# Patient Record
Sex: FEMALE | Race: OTHER | HISPANIC OR LATINO | Employment: FULL TIME | URBAN - METROPOLITAN AREA
[De-identification: names, ages, dates, MRNs, and addresses within clinical notes are randomized per-mention and may not be internally consistent; named-entity substitution may affect disease eponyms.]

---

## 2024-11-01 ENCOUNTER — APPOINTMENT (OUTPATIENT)
Dept: ADMISSIONS | Facility: MEDICAL CENTER | Age: 39
End: 2024-11-01
Attending: OBSTETRICS & GYNECOLOGY
Payer: COMMERCIAL

## 2024-11-07 ENCOUNTER — PRE-ADMISSION TESTING (OUTPATIENT)
Dept: ADMISSIONS | Facility: MEDICAL CENTER | Age: 39
End: 2024-11-07
Attending: OBSTETRICS & GYNECOLOGY
Payer: COMMERCIAL

## 2024-11-29 ENCOUNTER — HOSPITAL ENCOUNTER (INPATIENT)
Facility: MEDICAL CENTER | Age: 39
End: 2024-11-29
Attending: OBSTETRICS & GYNECOLOGY | Admitting: OBSTETRICS & GYNECOLOGY
Payer: COMMERCIAL

## 2024-11-29 ENCOUNTER — ANESTHESIA (OUTPATIENT)
Dept: OBGYN | Facility: MEDICAL CENTER | Age: 39
End: 2024-11-29
Payer: COMMERCIAL

## 2024-11-29 ENCOUNTER — ANESTHESIA EVENT (OUTPATIENT)
Dept: OBGYN | Facility: MEDICAL CENTER | Age: 39
End: 2024-11-29
Payer: COMMERCIAL

## 2024-11-29 DIAGNOSIS — G89.18 PAIN FOLLOWING SURGERY OR PROCEDURE: ICD-10-CM

## 2024-11-29 LAB
ABO GROUP BLD: NORMAL
BASOPHILS # BLD AUTO: 0.2 % (ref 0–1.8)
BASOPHILS # BLD AUTO: 0.4 % (ref 0–1.8)
BASOPHILS # BLD: 0.03 K/UL (ref 0–0.12)
BASOPHILS # BLD: 0.05 K/UL (ref 0–0.12)
BLD GP AB SCN SERPL QL: NORMAL
EOSINOPHIL # BLD AUTO: 0.35 K/UL (ref 0–0.51)
EOSINOPHIL # BLD AUTO: 0.4 K/UL (ref 0–0.51)
EOSINOPHIL NFR BLD: 2.4 % (ref 0–6.9)
EOSINOPHIL NFR BLD: 2.9 % (ref 0–6.9)
ERYTHROCYTE [DISTWIDTH] IN BLOOD BY AUTOMATED COUNT: 42.8 FL (ref 35.9–50)
ERYTHROCYTE [DISTWIDTH] IN BLOOD BY AUTOMATED COUNT: 43.8 FL (ref 35.9–50)
HBV SURFACE AG SER QL: NONREACTIVE
HCT VFR BLD AUTO: 36.6 % (ref 37–47)
HCT VFR BLD AUTO: 39.4 % (ref 37–47)
HCV AB SER QL: NONREACTIVE
HGB BLD-MCNC: 12.1 G/DL (ref 12–16)
HGB BLD-MCNC: 12.6 G/DL (ref 12–16)
HIV 1+2 AB+HIV1 P24 AG SERPL QL IA: NORMAL
HOLDING TUBE BB 8507: NORMAL
IMM GRANULOCYTES # BLD AUTO: 0.12 K/UL (ref 0–0.11)
IMM GRANULOCYTES # BLD AUTO: 0.17 K/UL (ref 0–0.11)
IMM GRANULOCYTES NFR BLD AUTO: 0.9 % (ref 0–0.9)
IMM GRANULOCYTES NFR BLD AUTO: 1.2 % (ref 0–0.9)
LYMPHOCYTES # BLD AUTO: 1.7 K/UL (ref 1–4.8)
LYMPHOCYTES # BLD AUTO: 1.82 K/UL (ref 1–4.8)
LYMPHOCYTES NFR BLD: 11.7 % (ref 22–41)
LYMPHOCYTES NFR BLD: 13.2 % (ref 22–41)
MCH RBC QN AUTO: 27.2 PG (ref 27–33)
MCH RBC QN AUTO: 27.4 PG (ref 27–33)
MCHC RBC AUTO-ENTMCNC: 32 G/DL (ref 32.2–35.5)
MCHC RBC AUTO-ENTMCNC: 33.1 G/DL (ref 32.2–35.5)
MCV RBC AUTO: 82.8 FL (ref 81.4–97.8)
MCV RBC AUTO: 84.9 FL (ref 81.4–97.8)
MONOCYTES # BLD AUTO: 0.56 K/UL (ref 0–0.85)
MONOCYTES # BLD AUTO: 0.82 K/UL (ref 0–0.85)
MONOCYTES NFR BLD AUTO: 3.9 % (ref 0–13.4)
MONOCYTES NFR BLD AUTO: 6 % (ref 0–13.4)
NEUTROPHILS # BLD AUTO: 10.57 K/UL (ref 1.82–7.42)
NEUTROPHILS # BLD AUTO: 11.73 K/UL (ref 1.82–7.42)
NEUTROPHILS NFR BLD: 76.6 % (ref 44–72)
NEUTROPHILS NFR BLD: 80.6 % (ref 44–72)
NRBC # BLD AUTO: 0 K/UL
NRBC # BLD AUTO: 0.02 K/UL
NRBC BLD-RTO: 0 /100 WBC (ref 0–0.2)
NRBC BLD-RTO: 0.1 /100 WBC (ref 0–0.2)
PLATELET # BLD AUTO: 231 K/UL (ref 164–446)
PLATELET # BLD AUTO: 233 K/UL (ref 164–446)
PMV BLD AUTO: 9.1 FL (ref 9–12.9)
PMV BLD AUTO: 9.2 FL (ref 9–12.9)
RBC # BLD AUTO: 4.42 M/UL (ref 4.2–5.4)
RBC # BLD AUTO: 4.64 M/UL (ref 4.2–5.4)
RH BLD: NORMAL
RUBV AB SER QL: 10.2 IU/ML
T PALLIDUM AB SER QL IA: NONREACTIVE
T PALLIDUM AB SER QL IA: NONREACTIVE
WBC # BLD AUTO: 13.8 K/UL (ref 4.8–10.8)
WBC # BLD AUTO: 14.5 K/UL (ref 4.8–10.8)

## 2024-11-29 PROCEDURE — 770002 HCHG ROOM/CARE - OB PRIVATE (112)

## 2024-11-29 PROCEDURE — 160041 HCHG SURGERY MINUTES - EA ADDL 1 MIN LEVEL 4: Performed by: OBSTETRICS & GYNECOLOGY

## 2024-11-29 PROCEDURE — 700105 HCHG RX REV CODE 258: Performed by: STUDENT IN AN ORGANIZED HEALTH CARE EDUCATION/TRAINING PROGRAM

## 2024-11-29 PROCEDURE — 86850 RBC ANTIBODY SCREEN: CPT

## 2024-11-29 PROCEDURE — 700101 HCHG RX REV CODE 250: Performed by: STUDENT IN AN ORGANIZED HEALTH CARE EDUCATION/TRAINING PROGRAM

## 2024-11-29 PROCEDURE — 160035 HCHG PACU - 1ST 60 MINS PHASE I: Performed by: OBSTETRICS & GYNECOLOGY

## 2024-11-29 PROCEDURE — 160009 HCHG ANES TIME/MIN: Performed by: OBSTETRICS & GYNECOLOGY

## 2024-11-29 PROCEDURE — 160002 HCHG RECOVERY MINUTES (STAT): Performed by: OBSTETRICS & GYNECOLOGY

## 2024-11-29 PROCEDURE — 700102 HCHG RX REV CODE 250 W/ 637 OVERRIDE(OP): Performed by: STUDENT IN AN ORGANIZED HEALTH CARE EDUCATION/TRAINING PROGRAM

## 2024-11-29 PROCEDURE — 86762 RUBELLA ANTIBODY: CPT

## 2024-11-29 PROCEDURE — 85025 COMPLETE CBC W/AUTO DIFF WBC: CPT | Mod: 91

## 2024-11-29 PROCEDURE — 87340 HEPATITIS B SURFACE AG IA: CPT

## 2024-11-29 PROCEDURE — A9270 NON-COVERED ITEM OR SERVICE: HCPCS | Performed by: STUDENT IN AN ORGANIZED HEALTH CARE EDUCATION/TRAINING PROGRAM

## 2024-11-29 PROCEDURE — 87389 HIV-1 AG W/HIV-1&-2 AB AG IA: CPT

## 2024-11-29 PROCEDURE — 160029 HCHG SURGERY MINUTES - 1ST 30 MINS LEVEL 4: Performed by: OBSTETRICS & GYNECOLOGY

## 2024-11-29 PROCEDURE — 700102 HCHG RX REV CODE 250 W/ 637 OVERRIDE(OP)

## 2024-11-29 PROCEDURE — A9270 NON-COVERED ITEM OR SERVICE: HCPCS

## 2024-11-29 PROCEDURE — 700102 HCHG RX REV CODE 250 W/ 637 OVERRIDE(OP): Performed by: OBSTETRICS & GYNECOLOGY

## 2024-11-29 PROCEDURE — 86901 BLOOD TYPING SEROLOGIC RH(D): CPT

## 2024-11-29 PROCEDURE — 700111 HCHG RX REV CODE 636 W/ 250 OVERRIDE (IP): Performed by: STUDENT IN AN ORGANIZED HEALTH CARE EDUCATION/TRAINING PROGRAM

## 2024-11-29 PROCEDURE — 86803 HEPATITIS C AB TEST: CPT

## 2024-11-29 PROCEDURE — 36415 COLL VENOUS BLD VENIPUNCTURE: CPT

## 2024-11-29 PROCEDURE — 700111 HCHG RX REV CODE 636 W/ 250 OVERRIDE (IP): Performed by: OBSTETRICS & GYNECOLOGY

## 2024-11-29 PROCEDURE — 86780 TREPONEMA PALLIDUM: CPT | Mod: 91

## 2024-11-29 PROCEDURE — 86900 BLOOD TYPING SEROLOGIC ABO: CPT

## 2024-11-29 PROCEDURE — 160048 HCHG OR STATISTICAL LEVEL 1-5: Performed by: OBSTETRICS & GYNECOLOGY

## 2024-11-29 PROCEDURE — A9270 NON-COVERED ITEM OR SERVICE: HCPCS | Performed by: OBSTETRICS & GYNECOLOGY

## 2024-11-29 PROCEDURE — 86592 SYPHILIS TEST NON-TREP QUAL: CPT

## 2024-11-29 PROCEDURE — C1755 CATHETER, INTRASPINAL: HCPCS | Performed by: OBSTETRICS & GYNECOLOGY

## 2024-11-29 PROCEDURE — 700105 HCHG RX REV CODE 258: Performed by: OBSTETRICS & GYNECOLOGY

## 2024-11-29 RX ORDER — CITRIC ACID/SODIUM CITRATE 334-500MG
30 SOLUTION, ORAL ORAL ONCE
Status: COMPLETED | OUTPATIENT
Start: 2024-11-29 | End: 2024-11-29

## 2024-11-29 RX ORDER — KETOROLAC TROMETHAMINE 15 MG/ML
INJECTION, SOLUTION INTRAMUSCULAR; INTRAVENOUS PRN
Status: DISCONTINUED | OUTPATIENT
Start: 2024-11-29 | End: 2024-11-29 | Stop reason: SURG

## 2024-11-29 RX ORDER — SODIUM CHLORIDE 9 MG/ML
INJECTION, SOLUTION INTRAVENOUS ONCE
Status: DISCONTINUED | OUTPATIENT
Start: 2024-11-29 | End: 2024-12-01 | Stop reason: HOSPADM

## 2024-11-29 RX ORDER — SODIUM CHLORIDE 9 MG/ML
INJECTION, SOLUTION INTRAVENOUS CONTINUOUS
Status: DISCONTINUED | OUTPATIENT
Start: 2024-11-29 | End: 2024-12-01 | Stop reason: HOSPADM

## 2024-11-29 RX ORDER — MISOPROSTOL 200 UG/1
1000 TABLET ORAL ONCE
Status: COMPLETED | OUTPATIENT
Start: 2024-11-29 | End: 2024-11-29

## 2024-11-29 RX ORDER — SODIUM CHLORIDE, SODIUM LACTATE, POTASSIUM CHLORIDE, AND CALCIUM CHLORIDE .6; .31; .03; .02 G/100ML; G/100ML; G/100ML; G/100ML
1000 INJECTION, SOLUTION INTRAVENOUS ONCE
Status: COMPLETED | OUTPATIENT
Start: 2024-11-29 | End: 2024-11-30

## 2024-11-29 RX ORDER — ONDANSETRON 2 MG/ML
4 INJECTION INTRAMUSCULAR; INTRAVENOUS EVERY 6 HOURS PRN
Status: DISCONTINUED | OUTPATIENT
Start: 2024-11-30 | End: 2024-12-01 | Stop reason: HOSPADM

## 2024-11-29 RX ORDER — SIMETHICONE 125 MG
125 TABLET,CHEWABLE ORAL 4 TIMES DAILY PRN
Status: DISCONTINUED | OUTPATIENT
Start: 2024-11-29 | End: 2024-12-01 | Stop reason: HOSPADM

## 2024-11-29 RX ORDER — METOCLOPRAMIDE HYDROCHLORIDE 5 MG/ML
10 INJECTION INTRAMUSCULAR; INTRAVENOUS ONCE
Status: COMPLETED | OUTPATIENT
Start: 2024-11-29 | End: 2024-11-29

## 2024-11-29 RX ORDER — ACETAMINOPHEN 500 MG
1000 TABLET ORAL EVERY 6 HOURS
Status: COMPLETED | OUTPATIENT
Start: 2024-11-29 | End: 2024-11-30

## 2024-11-29 RX ORDER — SODIUM CHLORIDE, SODIUM LACTATE, POTASSIUM CHLORIDE, CALCIUM CHLORIDE 600; 310; 30; 20 MG/100ML; MG/100ML; MG/100ML; MG/100ML
INJECTION, SOLUTION INTRAVENOUS
Status: DISCONTINUED | OUTPATIENT
Start: 2024-11-29 | End: 2024-11-29 | Stop reason: SURG

## 2024-11-29 RX ORDER — DIPHENHYDRAMINE HYDROCHLORIDE 50 MG/ML
INJECTION INTRAMUSCULAR; INTRAVENOUS PRN
Status: DISCONTINUED | OUTPATIENT
Start: 2024-11-29 | End: 2024-11-29 | Stop reason: SURG

## 2024-11-29 RX ORDER — CEFAZOLIN SODIUM 1 G/3ML
2 INJECTION, POWDER, FOR SOLUTION INTRAMUSCULAR; INTRAVENOUS ONCE
Status: DISCONTINUED | OUTPATIENT
Start: 2024-11-29 | End: 2024-11-29 | Stop reason: HOSPADM

## 2024-11-29 RX ORDER — OXYCODONE HCL 5 MG/5 ML
10 SOLUTION, ORAL ORAL
Status: DISCONTINUED | OUTPATIENT
Start: 2024-11-29 | End: 2024-11-29 | Stop reason: HOSPADM

## 2024-11-29 RX ORDER — CEFAZOLIN SODIUM 1 G/3ML
INJECTION, POWDER, FOR SOLUTION INTRAMUSCULAR; INTRAVENOUS PRN
Status: DISCONTINUED | OUTPATIENT
Start: 2024-11-29 | End: 2024-11-29 | Stop reason: SURG

## 2024-11-29 RX ORDER — ONDANSETRON 4 MG/1
4 TABLET, ORALLY DISINTEGRATING ORAL EVERY 6 HOURS PRN
Status: DISCONTINUED | OUTPATIENT
Start: 2024-11-30 | End: 2024-12-01 | Stop reason: HOSPADM

## 2024-11-29 RX ORDER — SODIUM CHLORIDE, SODIUM LACTATE, POTASSIUM CHLORIDE, CALCIUM CHLORIDE 600; 310; 30; 20 MG/100ML; MG/100ML; MG/100ML; MG/100ML
INJECTION, SOLUTION INTRAVENOUS CONTINUOUS
Status: DISCONTINUED | OUTPATIENT
Start: 2024-11-29 | End: 2024-12-01 | Stop reason: HOSPADM

## 2024-11-29 RX ORDER — SODIUM CHLORIDE 9 MG/ML
1000 INJECTION, SOLUTION INTRAVENOUS ONCE
Status: DISCONTINUED | OUTPATIENT
Start: 2024-11-29 | End: 2024-11-29 | Stop reason: HOSPADM

## 2024-11-29 RX ORDER — IBUPROFEN 800 MG/1
800 TABLET, FILM COATED ORAL EVERY 8 HOURS
Status: DISCONTINUED | OUTPATIENT
Start: 2024-11-30 | End: 2024-12-01 | Stop reason: HOSPADM

## 2024-11-29 RX ORDER — HYDROMORPHONE HYDROCHLORIDE 1 MG/ML
0.2 INJECTION, SOLUTION INTRAMUSCULAR; INTRAVENOUS; SUBCUTANEOUS
Status: DISCONTINUED | OUTPATIENT
Start: 2024-11-29 | End: 2024-11-29 | Stop reason: HOSPADM

## 2024-11-29 RX ORDER — MISOPROSTOL 200 UG/1
TABLET ORAL
Status: COMPLETED
Start: 2024-11-29 | End: 2024-11-29

## 2024-11-29 RX ORDER — VITAMIN A ACETATE, BETA CAROTENE, ASCORBIC ACID, CHOLECALCIFEROL, .ALPHA.-TOCOPHEROL ACETATE, DL-, THIAMINE MONONITRATE, RIBOFLAVIN, NIACINAMIDE, PYRIDOXINE HYDROCHLORIDE, FOLIC ACID, CYANOCOBALAMIN, CALCIUM CARBONATE, FERROUS FUMARATE, ZINC OXIDE, CUPRIC OXIDE 3080; 12; 120; 400; 1; 1.84; 3; 20; 22; 920; 25; 200; 27; 10; 2 [IU]/1; UG/1; MG/1; [IU]/1; MG/1; MG/1; MG/1; MG/1; MG/1; [IU]/1; MG/1; MG/1; MG/1; MG/1; MG/1
1 TABLET, FILM COATED ORAL
Status: DISCONTINUED | OUTPATIENT
Start: 2024-11-29 | End: 2024-12-01 | Stop reason: HOSPADM

## 2024-11-29 RX ORDER — LABETALOL HYDROCHLORIDE 5 MG/ML
5 INJECTION, SOLUTION INTRAVENOUS
Status: DISCONTINUED | OUTPATIENT
Start: 2024-11-29 | End: 2024-11-29 | Stop reason: HOSPADM

## 2024-11-29 RX ORDER — OXYCODONE HYDROCHLORIDE 10 MG/1
10 TABLET ORAL EVERY 4 HOURS PRN
Status: DISPENSED | OUTPATIENT
Start: 2024-11-29 | End: 2024-11-30

## 2024-11-29 RX ORDER — DEXAMETHASONE SODIUM PHOSPHATE 4 MG/ML
INJECTION, SOLUTION INTRA-ARTICULAR; INTRALESIONAL; INTRAMUSCULAR; INTRAVENOUS; SOFT TISSUE PRN
Status: DISCONTINUED | OUTPATIENT
Start: 2024-11-29 | End: 2024-11-29 | Stop reason: SURG

## 2024-11-29 RX ORDER — METOPROLOL TARTRATE 1 MG/ML
1 INJECTION, SOLUTION INTRAVENOUS
Status: DISCONTINUED | OUTPATIENT
Start: 2024-11-29 | End: 2024-11-29 | Stop reason: HOSPADM

## 2024-11-29 RX ORDER — DIPHENHYDRAMINE HYDROCHLORIDE 50 MG/ML
12.5 INJECTION INTRAMUSCULAR; INTRAVENOUS EVERY 6 HOURS PRN
Status: ACTIVE | OUTPATIENT
Start: 2024-11-29 | End: 2024-11-30

## 2024-11-29 RX ORDER — HALOPERIDOL 5 MG/ML
1 INJECTION INTRAMUSCULAR
Status: DISCONTINUED | OUTPATIENT
Start: 2024-11-29 | End: 2024-11-29 | Stop reason: HOSPADM

## 2024-11-29 RX ORDER — ONDANSETRON 2 MG/ML
INJECTION INTRAMUSCULAR; INTRAVENOUS PRN
Status: DISCONTINUED | OUTPATIENT
Start: 2024-11-29 | End: 2024-11-29 | Stop reason: SURG

## 2024-11-29 RX ORDER — HYDROMORPHONE HYDROCHLORIDE 1 MG/ML
0.4 INJECTION, SOLUTION INTRAMUSCULAR; INTRAVENOUS; SUBCUTANEOUS
Status: ACTIVE | OUTPATIENT
Start: 2024-11-29 | End: 2024-11-30

## 2024-11-29 RX ORDER — DIPHENHYDRAMINE HYDROCHLORIDE 50 MG/ML
25 INJECTION INTRAMUSCULAR; INTRAVENOUS EVERY 6 HOURS PRN
Status: ACTIVE | OUTPATIENT
Start: 2024-11-29 | End: 2024-11-30

## 2024-11-29 RX ORDER — ALBUTEROL SULFATE 5 MG/ML
2.5 SOLUTION RESPIRATORY (INHALATION)
Status: DISCONTINUED | OUTPATIENT
Start: 2024-11-29 | End: 2024-11-29 | Stop reason: HOSPADM

## 2024-11-29 RX ORDER — OXYTOCIN 10 [USP'U]/ML
INJECTION, SOLUTION INTRAMUSCULAR; INTRAVENOUS PRN
Status: DISCONTINUED | OUTPATIENT
Start: 2024-11-29 | End: 2024-11-29 | Stop reason: SURG

## 2024-11-29 RX ORDER — BUPIVACAINE HYDROCHLORIDE 7.5 MG/ML
INJECTION, SOLUTION INTRASPINAL
Status: COMPLETED | OUTPATIENT
Start: 2024-11-29 | End: 2024-11-29

## 2024-11-29 RX ORDER — HYDROMORPHONE HYDROCHLORIDE 1 MG/ML
0.1 INJECTION, SOLUTION INTRAMUSCULAR; INTRAVENOUS; SUBCUTANEOUS
Status: DISCONTINUED | OUTPATIENT
Start: 2024-11-29 | End: 2024-11-29 | Stop reason: HOSPADM

## 2024-11-29 RX ORDER — ACETAMINOPHEN 500 MG
1000 TABLET ORAL EVERY 6 HOURS
Status: DISCONTINUED | OUTPATIENT
Start: 2024-11-30 | End: 2024-12-01 | Stop reason: HOSPADM

## 2024-11-29 RX ORDER — OXYCODONE HCL 5 MG/5 ML
5 SOLUTION, ORAL ORAL
Status: DISCONTINUED | OUTPATIENT
Start: 2024-11-29 | End: 2024-11-29 | Stop reason: HOSPADM

## 2024-11-29 RX ORDER — HYDROMORPHONE HYDROCHLORIDE 1 MG/ML
0.2 INJECTION, SOLUTION INTRAMUSCULAR; INTRAVENOUS; SUBCUTANEOUS
Status: ACTIVE | OUTPATIENT
Start: 2024-11-29 | End: 2024-11-30

## 2024-11-29 RX ORDER — ACETAMINOPHEN 500 MG
1000 TABLET ORAL EVERY 6 HOURS PRN
Status: DISCONTINUED | OUTPATIENT
Start: 2024-12-03 | End: 2024-12-01 | Stop reason: HOSPADM

## 2024-11-29 RX ORDER — OXYCODONE HYDROCHLORIDE 5 MG/1
5 TABLET ORAL EVERY 4 HOURS PRN
Status: DISCONTINUED | OUTPATIENT
Start: 2024-11-30 | End: 2024-12-01 | Stop reason: HOSPADM

## 2024-11-29 RX ORDER — DOCUSATE SODIUM 100 MG/1
100 CAPSULE, LIQUID FILLED ORAL 2 TIMES DAILY PRN
Status: DISCONTINUED | OUTPATIENT
Start: 2024-11-29 | End: 2024-12-01 | Stop reason: HOSPADM

## 2024-11-29 RX ORDER — EPHEDRINE SULFATE 50 MG/ML
10 INJECTION, SOLUTION INTRAVENOUS
Status: ACTIVE | OUTPATIENT
Start: 2024-11-29 | End: 2024-11-30

## 2024-11-29 RX ORDER — EPHEDRINE SULFATE 50 MG/ML
5 INJECTION, SOLUTION INTRAVENOUS
Status: DISCONTINUED | OUTPATIENT
Start: 2024-11-29 | End: 2024-11-29 | Stop reason: HOSPADM

## 2024-11-29 RX ORDER — OXYCODONE HYDROCHLORIDE 5 MG/1
5 TABLET ORAL EVERY 4 HOURS PRN
Status: DISPENSED | OUTPATIENT
Start: 2024-11-29 | End: 2024-11-30

## 2024-11-29 RX ORDER — CALCIUM CARBONATE 500 MG/1
1000 TABLET, CHEWABLE ORAL EVERY 6 HOURS PRN
Status: DISCONTINUED | OUTPATIENT
Start: 2024-11-29 | End: 2024-12-01 | Stop reason: HOSPADM

## 2024-11-29 RX ORDER — ONDANSETRON 2 MG/ML
4 INJECTION INTRAMUSCULAR; INTRAVENOUS
Status: DISCONTINUED | OUTPATIENT
Start: 2024-11-29 | End: 2024-11-29 | Stop reason: HOSPADM

## 2024-11-29 RX ORDER — DIPHENHYDRAMINE HYDROCHLORIDE 50 MG/ML
25 INJECTION INTRAMUSCULAR; INTRAVENOUS EVERY 6 HOURS PRN
Status: DISCONTINUED | OUTPATIENT
Start: 2024-11-30 | End: 2024-12-01 | Stop reason: HOSPADM

## 2024-11-29 RX ORDER — DIPHENHYDRAMINE HYDROCHLORIDE 50 MG/ML
12.5 INJECTION INTRAMUSCULAR; INTRAVENOUS
Status: DISCONTINUED | OUTPATIENT
Start: 2024-11-29 | End: 2024-11-29 | Stop reason: HOSPADM

## 2024-11-29 RX ORDER — IBUPROFEN 800 MG/1
800 TABLET, FILM COATED ORAL EVERY 8 HOURS PRN
Status: DISCONTINUED | OUTPATIENT
Start: 2024-12-03 | End: 2024-12-01 | Stop reason: HOSPADM

## 2024-11-29 RX ORDER — HYDRALAZINE HYDROCHLORIDE 20 MG/ML
5 INJECTION INTRAMUSCULAR; INTRAVENOUS
Status: DISCONTINUED | OUTPATIENT
Start: 2024-11-29 | End: 2024-11-29 | Stop reason: HOSPADM

## 2024-11-29 RX ORDER — OXYCODONE HYDROCHLORIDE 10 MG/1
10 TABLET ORAL EVERY 4 HOURS PRN
Status: DISCONTINUED | OUTPATIENT
Start: 2024-11-30 | End: 2024-12-01 | Stop reason: HOSPADM

## 2024-11-29 RX ORDER — MORPHINE SULFATE 0.5 MG/ML
INJECTION, SOLUTION EPIDURAL; INTRATHECAL; INTRAVENOUS PRN
Status: DISCONTINUED | OUTPATIENT
Start: 2024-11-29 | End: 2024-11-29 | Stop reason: SURG

## 2024-11-29 RX ORDER — OXYTOCIN 10 [USP'U]/ML
10 INJECTION, SOLUTION INTRAMUSCULAR; INTRAVENOUS
Status: DISCONTINUED | OUTPATIENT
Start: 2024-11-29 | End: 2024-12-01 | Stop reason: HOSPADM

## 2024-11-29 RX ORDER — TRANEXAMIC ACID 100 MG/ML
INJECTION, SOLUTION INTRAVENOUS PRN
Status: DISCONTINUED | OUTPATIENT
Start: 2024-11-29 | End: 2024-11-29 | Stop reason: SURG

## 2024-11-29 RX ORDER — MEPERIDINE HYDROCHLORIDE 25 MG/ML
6.25 INJECTION INTRAMUSCULAR; INTRAVENOUS; SUBCUTANEOUS
Status: DISCONTINUED | OUTPATIENT
Start: 2024-11-29 | End: 2024-11-29 | Stop reason: HOSPADM

## 2024-11-29 RX ORDER — ONDANSETRON 2 MG/ML
4 INJECTION INTRAMUSCULAR; INTRAVENOUS EVERY 6 HOURS PRN
Status: ACTIVE | OUTPATIENT
Start: 2024-11-29 | End: 2024-11-30

## 2024-11-29 RX ORDER — HYDROMORPHONE HYDROCHLORIDE 1 MG/ML
0.4 INJECTION, SOLUTION INTRAMUSCULAR; INTRAVENOUS; SUBCUTANEOUS
Status: DISCONTINUED | OUTPATIENT
Start: 2024-11-29 | End: 2024-11-29 | Stop reason: HOSPADM

## 2024-11-29 RX ORDER — DIPHENHYDRAMINE HCL 25 MG
25 TABLET ORAL EVERY 6 HOURS PRN
Status: DISCONTINUED | OUTPATIENT
Start: 2024-11-30 | End: 2024-12-01 | Stop reason: HOSPADM

## 2024-11-29 RX ORDER — KETOROLAC TROMETHAMINE 15 MG/ML
15 INJECTION, SOLUTION INTRAMUSCULAR; INTRAVENOUS EVERY 6 HOURS
Status: DISPENSED | OUTPATIENT
Start: 2024-11-29 | End: 2024-11-30

## 2024-11-29 RX ADMIN — CEFAZOLIN 3 G: 1 INJECTION, POWDER, FOR SOLUTION INTRAMUSCULAR; INTRAVENOUS at 08:42

## 2024-11-29 RX ADMIN — ACETAMINOPHEN 1000 MG: 500 TABLET ORAL at 11:51

## 2024-11-29 RX ADMIN — ACETAMINOPHEN 1000 MG: 500 TABLET ORAL at 23:43

## 2024-11-29 RX ADMIN — OXYTOCIN 125 ML/HR: 10 INJECTION, SOLUTION INTRAMUSCULAR; INTRAVENOUS at 10:42

## 2024-11-29 RX ADMIN — FENTANYL CITRATE 15 MCG: 50 INJECTION, SOLUTION INTRAMUSCULAR; INTRAVENOUS at 08:37

## 2024-11-29 RX ADMIN — SODIUM CHLORIDE, SODIUM LACTATE, POTASSIUM CHLORIDE, AND CALCIUM CHLORIDE 1000 ML: .6; .31; .03; .02 INJECTION, SOLUTION INTRAVENOUS at 08:15

## 2024-11-29 RX ADMIN — TRANEXAMIC ACID 1000 MG: 100 INJECTION, SOLUTION INTRAVENOUS at 09:01

## 2024-11-29 RX ADMIN — KETOROLAC TROMETHAMINE 15 MG: 15 INJECTION, SOLUTION INTRAMUSCULAR; INTRAVENOUS at 09:23

## 2024-11-29 RX ADMIN — MISOPROSTOL 1000 MCG: 200 TABLET ORAL at 10:00

## 2024-11-29 RX ADMIN — METOCLOPRAMIDE 10 MG: 5 INJECTION, SOLUTION INTRAMUSCULAR; INTRAVENOUS at 08:11

## 2024-11-29 RX ADMIN — SODIUM CHLORIDE, POTASSIUM CHLORIDE, SODIUM LACTATE AND CALCIUM CHLORIDE: 600; 310; 30; 20 INJECTION, SOLUTION INTRAVENOUS at 08:56

## 2024-11-29 RX ADMIN — SODIUM CHLORIDE 0.5 MCG/KG/MIN: 9 INJECTION, SOLUTION INTRAVENOUS at 08:39

## 2024-11-29 RX ADMIN — SODIUM CITRATE AND CITRIC ACID MONOHYDRATE 30 ML: 334; 500 SOLUTION ORAL at 08:24

## 2024-11-29 RX ADMIN — DEXAMETHASONE SODIUM PHOSPHATE 8 MG: 4 INJECTION INTRA-ARTICULAR; INTRALESIONAL; INTRAMUSCULAR; INTRAVENOUS; SOFT TISSUE at 08:42

## 2024-11-29 RX ADMIN — KETOROLAC TROMETHAMINE 15 MG: 15 INJECTION, SOLUTION INTRAMUSCULAR; INTRAVENOUS at 16:24

## 2024-11-29 RX ADMIN — DIPHENHYDRAMINE HYDROCHLORIDE 25 MG: 50 INJECTION, SOLUTION INTRAMUSCULAR; INTRAVENOUS at 09:09

## 2024-11-29 RX ADMIN — SODIUM CHLORIDE, POTASSIUM CHLORIDE, SODIUM LACTATE AND CALCIUM CHLORIDE: 600; 310; 30; 20 INJECTION, SOLUTION INTRAVENOUS at 07:27

## 2024-11-29 RX ADMIN — PRENATAL WITH FERROUS FUM AND FOLIC ACID 1 TABLET: 3080; 920; 120; 400; 22; 1.84; 3; 20; 10; 1; 12; 200; 27; 25; 2 TABLET ORAL at 11:51

## 2024-11-29 RX ADMIN — ONDANSETRON 4 MG: 2 INJECTION INTRAMUSCULAR; INTRAVENOUS at 08:33

## 2024-11-29 RX ADMIN — OXYCODONE HYDROCHLORIDE 10 MG: 10 TABLET ORAL at 17:51

## 2024-11-29 RX ADMIN — FAMOTIDINE 20 MG: 10 INJECTION, SOLUTION INTRAVENOUS at 08:11

## 2024-11-29 RX ADMIN — BUPIVACAINE HYDROCHLORIDE IN DEXTROSE 1.4 ML: 7.5 INJECTION, SOLUTION SUBARACHNOID at 08:37

## 2024-11-29 RX ADMIN — MORPHINE SULFATE 100 MCG: 0.5 INJECTION, SOLUTION EPIDURAL; INTRATHECAL; INTRAVENOUS at 08:37

## 2024-11-29 RX ADMIN — OXYTOCIN 20 UNITS: 10 INJECTION, SOLUTION INTRAMUSCULAR; INTRAVENOUS at 08:58

## 2024-11-29 RX ADMIN — OXYCODONE 5 MG: 5 TABLET ORAL at 13:51

## 2024-11-29 RX ADMIN — SODIUM CHLORIDE, SODIUM LACTATE, POTASSIUM CHLORIDE, AND CALCIUM CHLORIDE: .6; .31; .03; .02 INJECTION, SOLUTION INTRAVENOUS at 18:22

## 2024-11-29 RX ADMIN — ACETAMINOPHEN 1000 MG: 500 TABLET ORAL at 17:34

## 2024-11-29 SDOH — ECONOMIC STABILITY: TRANSPORTATION INSECURITY
IN THE PAST 12 MONTHS, HAS THE LACK OF TRANSPORTATION KEPT YOU FROM MEDICAL APPOINTMENTS OR FROM GETTING MEDICATIONS?: NO

## 2024-11-29 SDOH — ECONOMIC STABILITY: TRANSPORTATION INSECURITY
IN THE PAST 12 MONTHS, HAS LACK OF RELIABLE TRANSPORTATION KEPT YOU FROM MEDICAL APPOINTMENTS, MEETINGS, WORK OR FROM GETTING THINGS NEEDED FOR DAILY LIVING?: NO

## 2024-11-29 ASSESSMENT — SOCIAL DETERMINANTS OF HEALTH (SDOH)
WITHIN THE PAST 12 MONTHS, YOU WORRIED THAT YOUR FOOD WOULD RUN OUT BEFORE YOU GOT THE MONEY TO BUY MORE: NEVER TRUE
WITHIN THE PAST 12 MONTHS, THE FOOD YOU BOUGHT JUST DIDN'T LAST AND YOU DIDN'T HAVE MONEY TO GET MORE: NEVER TRUE
IN THE PAST 12 MONTHS, HAS THE ELECTRIC, GAS, OIL, OR WATER COMPANY THREATENED TO SHUT OFF SERVICE IN YOUR HOME?: NO
WITHIN THE LAST YEAR, HAVE TO BEEN RAPED OR FORCED TO HAVE ANY KIND OF SEXUAL ACTIVITY BY YOUR PARTNER OR EX-PARTNER?: NO
WITHIN THE LAST YEAR, HAVE YOU BEEN HUMILIATED OR EMOTIONALLY ABUSED IN OTHER WAYS BY YOUR PARTNER OR EX-PARTNER?: NO
WITHIN THE LAST YEAR, HAVE YOU BEEN AFRAID OF YOUR PARTNER OR EX-PARTNER?: NO
WITHIN THE LAST YEAR, HAVE YOU BEEN KICKED, HIT, SLAPPED, OR OTHERWISE PHYSICALLY HURT BY YOUR PARTNER OR EX-PARTNER?: NO

## 2024-11-29 ASSESSMENT — PAIN DESCRIPTION - PAIN TYPE
TYPE: SURGICAL PAIN
TYPE: ACUTE PAIN
TYPE: SURGICAL PAIN

## 2024-11-29 ASSESSMENT — LIFESTYLE VARIABLES
TOTAL SCORE: 0
EVER HAD A DRINK FIRST THING IN THE MORNING TO STEADY YOUR NERVES TO GET RID OF A HANGOVER: NO
EVER FELT BAD OR GUILTY ABOUT YOUR DRINKING: NO
AVERAGE NUMBER OF DAYS PER WEEK YOU HAVE A DRINK CONTAINING ALCOHOL: 0
HAVE PEOPLE ANNOYED YOU BY CRITICIZING YOUR DRINKING: NO
HAVE YOU EVER FELT YOU SHOULD CUT DOWN ON YOUR DRINKING: NO
TOTAL SCORE: 0
ALCOHOL_USE: NO
CONSUMPTION TOTAL: NEGATIVE
ON A TYPICAL DAY WHEN YOU DRINK ALCOHOL HOW MANY DRINKS DO YOU HAVE: 0
TOTAL SCORE: 0
HOW MANY TIMES IN THE PAST YEAR HAVE YOU HAD 5 OR MORE DRINKS IN A DAY: 0
DOES PATIENT WANT TO STOP DRINKING: NO

## 2024-11-29 ASSESSMENT — PATIENT HEALTH QUESTIONNAIRE - PHQ9
2. FEELING DOWN, DEPRESSED, IRRITABLE, OR HOPELESS: NOT AT ALL
1. LITTLE INTEREST OR PLEASURE IN DOING THINGS: NOT AT ALL
SUM OF ALL RESPONSES TO PHQ9 QUESTIONS 1 AND 2: 0

## 2024-11-29 ASSESSMENT — PAIN SCALES - GENERAL: PAINLEVEL: 0 - NO PAIN

## 2024-11-29 NOTE — CARE PLAN
The patient is Stable - Low risk of patient condition declining or worsening    Shift Goals  Clinical Goals: VSS    Progress made toward(s) clinical / shift goals:    Problem: Knowledge Deficit - L&D  Goal: Patient and family/caregivers will demonstrate understanding of plan of care, disease process/condition, diagnostic tests and medications  Description: Target End Date:  1-3 days or as soon as patient condition allows    1.  Oriented to unit, equipment, visitation policy and means for communicating concern  2.  Complete/review Learning Assessment  3.  Assess patient's preparation, knowledge level and expectations  4.  Provide accurate information in basic terms, clarify misconceptions  5.  Explain disease process/condition, treatment plan, diagnostic tests and medications  6.  Encourage patient and support person to ask questions and verbalize their understanding  7.  Instruct patient/support person in basic interpretation of fetal monitor  8.  Obtain informed consent when appropriate  9.  Demonstrate breathing/relaxation techniques  Outcome: Progressing  Note: Patient encouraged to ask RN questions about POC that the patient may require further understanding     Problem: Pain - Standard  Goal: Alleviation of pain or a reduction in pain to the patient’s comfort goal  Description: Target End Date:  Prior to discharge or change in level of care    Document on Vitals flowsheet    1.  Document pain using the appropriate pain scale per order or unit policy  2.  Educate and implement non-pharmacologic comfort measures (i.e. relaxation, distraction, massage, cold/heat therapy, etc.)  3.  Pain management medications as ordered  4.  Reassess pain after pain med administration per policy  5.  If opiods administered assess patient's response to pain medication is appropriate per POSS sedation scale  6.  Follow pain management plan developed in collaboration with patient and interdisciplinary team (including palliative care  or pain specialists if applicable)  Outcome: Progressing  Note: Patient educated on post surgical pain management and encouraged to express pain intervention needs to RN.        Patient is not progressing towards the following goals:

## 2024-11-29 NOTE — H&P
DATE OF ADMISSION:  2024     DATE OF PROCEDURE:  2024     CSN number:  [id number]     PREOPERATIVE DIAGNOSES:   1.  Intrauterine pregnancy at 37+1 weeks' gestation.  2.  History of previous , desires elective repeat.  3.  Advanced maternal age.  4.  Umbilical vein varix followed by [facility] with the   recommendation of delivery at 37 weeks' gestation.     HISTORY OF PRESENT ILLNESS:  The patient is a 39-year-old  2, para   1-0-0-1, at 37+1 weeks' gestation based upon a 9+4 week ultrasound performed   on 2024.  The patient has been followed by [facility] for   advanced maternal age and history of previous  and was noted to have   an umbilical vein varix.  This has been evaluated through multiple   ultrasounds.  Hepatic portion of the umbilical vein was significantly dilated.    They recommended delivery between 37 and 37-1/2 weeks' gestation.  She has   been followed by NSTs twice weekly.  Risks, benefits and alternatives of a   repeat low transverse  section via Pfannenstiel skin incision was   discussed with the patient and she agreed to proceed.  She declines tubal   sterilization.     Prenatal care with Dr. Yanelis Buck, first visit on 2024 at 9+4 weeks'   gestation, total visits 13, total weight gain 40 pounds.  Third-trimester   blood pressures 119-142/65-85.     PRENATAL LABS:  GBS negative on 2024.  One-hour .  MaterniT 21   testing was negative for trisomies 21, 18, 13, and female fetus noted.  MSAFP   for open spina bifida risk was screen negative.  GC/chlamydia;   negative/negative.     I am not seeing a copy of the remainder of her prenatal labs and she will need   those drawn upon admission.     OBSTETRICAL HISTORY:  Primary  at 40 weeks' gestation, 8 pound male   infant.      OBSTETRIC ULTRASOUND:    1.  On 2024 at 9+4 weeks' gestation, GURINDER 2024.  1.  On 2024 at 13+0 weeks'  gestation, GURINDER 2024.  2.  On 2024 at 13+6 weeks' gestation, GURINDER 2024.  3.  On 2024 at 19+6 weeks' gestation, GURINDER 2024.  4.  On 2024 at 23+5 weeks' gestation, GURINDER 2024.  5.  On 2024 at 27+5 weeks' gestation performed at [facility], estimated fetal weight 1102 grams, 2 pounds 7 ounces.  28th percentile   for growth.  Female fetus.  CARMINA 18.1.  Umbilical vein varices visualized   immediately within the cord insertion site.  Hepatic portion of the umbilical   vein is significantly dilated.  Posterior placenta, no placenta previa.     ISSUES THIS PREGNANCY:  AMA status, morbid obesity, umbilical vein varices,   history of previous  x1.     PAST SURGICAL HISTORY:   x1.  Also, she has undergone a hysteroscopic   endometrial polypectomy.     PAST MEDICAL HISTORY:  Hashimoto's thyroiditis.  Normal thyroid studies.  She   also has a history of asthma and a previous history of COVID-19.     ALLERGIES:  SHELLFISH LEADING TO SWELLING, IODINE AND ACYCLOVIR.     SOCIAL HISTORY:  She denies alcohol, tobacco or drugs of abuse.  She is   .     MEDICATIONS:  Baby aspirin, prenatal vitamins and vitamin B12.     PHYSICAL EXAMINATION:    VITAL SIGNS:  Stable.  She is afebrile.  GENERAL:  Alert, awake and oriented x3, in no acute distress.  LUNGS:  Clear to auscultation bilaterally.  HEART:  Regular rate and rhythm.  No murmurs, rubs or gallops.  S1, S2 intact.  PELVIC:  Sterile vaginal exam is deferred.  EXTREMITIES:  No clubbing, cyanosis or edema.     BMI is 45.      NST is reactive, without decelerations.     LABORATORY DATA:  Preoperative laboratory studies are pending.     Estimated fetal weight 2900 grams.     ASSESSMENT AND PLAN:  A 39-year-old  2, para 1-0-0-1, at 37+1 weeks'   gestation, who has a history of a previous  x1, who is morbidly obese   and who is advanced maternal age.  In addition, there is umbilical vein   varices  for which she has been [facility] by [facility] and they   recommend delivery at 37 weeks' gestation.  Risks, benefits and alternatives   of a repeat  without bilateral salpingectomy discussed with the   patient and she agrees to proceed.        ______________________________  MD JAZMINE Cunha/ANITA    DD:  2024 16:18  DT:  2024 17:11    Job#:  [id number]

## 2024-11-29 NOTE — PROGRESS NOTES
1055: Patient was transfered from L&D via [facility]. Recieved report from Kisha GUARDADO. Fundus firm. Bands and cuddles verified. Patient oriented to room including sleep sack education, I/O sheet, feeding frequency, call light, when to push emergency button, keeping infant swaddled, and a hat on infant at all times. Welcome bag given. Plan of care discussed. Patient encouraged to call with any needs.     1730: Went to get up this pt up with ASHUTOSH Ambrose. Noticed a few long stringy clots. Fundus firm. Weighed pad see charting. Called Christin elliott RN. Fundus firm. Pt stated not feeling dizzy, weak, or light headed when asked.     1745: Pt up at side of bed for mohit care no bleeding. Fundus firm.    1802: MD Buck notified. No new orders.

## 2024-11-29 NOTE — ANESTHESIA TIME REPORT
Anesthesia Start and Stop Event Times       Date Time Event    11/29/2024 0826 Ready for Procedure     0829 Anesthesia Start     0935 Anesthesia Stop          Responsible Staff  11/29/24      Name Role Begin End    Vito Willams D.O. Anesth 0829 0935          Overtime Reason:  no overtime (within assigned shift)    Comments:

## 2024-11-29 NOTE — ANESTHESIA POSTPROCEDURE EVALUATION
Patient: Naomi Rivera    Procedure Summary       Date: 24 Room / Location: [facility] OR 01 / SURGERY LABOR AND DELIVERY    Anesthesia Start: 829 Anesthesia Stop: 935    Procedure: REPEAT  SECTION AND ANY OTHER MEDICALLY NECESSARY PROCEDURES Diagnosis: (PREGNANCY 3RD TRIMESTER, UMBILICAL VEIN VARIX - 37;1D)    Surgeons: Ynaelis Mei M.D. Responsible Provider: Vito Willams D.O.    Anesthesia Type: spinal ASA Status: 3            Final Anesthesia Type: spinal  Last vitals  BP   Blood Pressure: 124/72    Temp   36.4 °C (97.6 °F)    Pulse   75   Resp   16    SpO2   97 %      Anesthesia Post Evaluation    Patient location during evaluation: PACU  Patient participation: complete - patient participated  Level of consciousness: awake and alert    Airway patency: patent  Anesthetic complications: no  Cardiovascular status: hemodynamically stable  Respiratory status: acceptable  Hydration status: euvolemic    PONV: none    patient able to participate, but full recovery from regional anesthesia has not occurred and is not expected within the stipulated timeframe for the completion of the evaluation      No notable events documented.     Nurse Pain Score: 0 (NPRS)

## 2024-11-29 NOTE — ANESTHESIA PREPROCEDURE EVALUATION
Case: [id number] Date/Time: 24 0800    Procedure: REPEAT  SECTION AND ANY OTHER MEDICALLY NECESSARY PROCEDURES    Pre-op diagnosis: PREGNANCY 3RD TRIMESTER, UMBILICAL VEIN VARIX - 37;1D    Location: [facility] OR  / SURGERY LABOR AND DELIVERY    Surgeons: Yanelis Mei M.D.          Anes H&P:  PAST MEDICAL HISTORY:   39 y.o. female who presents for Procedure(s):  REPEAT  SECTION AND ANY OTHER MEDICALLY NECESSARY PROCEDURES.  She has current and past medical problems significant for:    Past Medical History:   Diagnosis Date    Acid reflux 2023    AR (allergic rhinitis)     Arthritis 2023    Back    Bronchitis     H/O    Cellulitis and abscess left foot    Disorder of thyroid     hashimotos    Eczema, dyshidrotic     Heart burn     just with pregnancy    Migraines     Preeclampsia     H/O    RAD (reactive airway disease)        SMOKING/ALCOHOL/RECREATIONAL DRUG USE:  Social History     Tobacco Use    Smoking status: Never     Passive exposure: Never    Smokeless tobacco: Never   Vaping Use    Vaping status: Never Used   Substance Use Topics    Alcohol use: No    Drug use: No     Social History     Substance and Sexual Activity   Drug Use No       PAST SURGICAL HISTORY:  Past Surgical History:   Procedure Laterality Date    [place] LAP,DIAGNOSTIC ABDOMEN N/A 2023    Procedure: PELVISCOPY;  Surgeon: Yanelis Mei M.D.;  Location: SURGERY SAME DAY [facility];  Service: Gynecology    HYSTEROSCOPY WITH MYOSURE N/A 2023    Procedure: PELIVIC EXAM UNDER ANESTHESIA, DIAGNOSTIC HYSTEROSCOPY / OPERATIVE HYSTEROSCOPY, EXCISION  POLYP;  Surgeon: Yanelis Mei M.D.;  Location: SURGERY SAME DAY [facility];  Service: Gynecology    PRIMARY C SECTION  2022    Procedure:  SECTION, PRIMARY;  Surgeon: Yanelis Mei M.D.;  Location: SURGERY LABOR AND DELIVERY;  Service: Labor and Delivery       ALLERGIES:   Allergies   Allergen Reactions     Peanut-Derived Anaphylaxis    Acyclovir      Lip swelling    Iodine     Metformin Hcl Nausea    Shellfish Allergy        MEDICATIONS:  No current facility-administered medications on file prior to encounter.     Current Outpatient Medications on File Prior to Encounter   Medication Sig Dispense Refill    albuterol 108 (90 Base) MCG/ACT Aero Soln inhalation aerosol INHALE 2 PUFFS BY MOUTH EVERY 6 HOURS AS NEEDED FOR SHORTNESS OF BREATH 8.5 Each 5    valacyclovir (VALTREX) 1 GM Tab Take 2 Tablets by mouth 2 times a day. 360 Tablet 3    fluticasone (FLONASE) 50 MCG/ACT nasal spray ADMINISTER 2 SPRAYS INTO AFFECTED NOSTRIL(S) EVERY DAY (Patient not taking: Reported on 11/7/2024) 48 mL 2    levothyroxine (SYNTHROID) 25 MCG Tab Take 1 Tablet by mouth every morning on an empty stomach. (Patient taking differently: Take 25 mcg by mouth every morning on an empty stomach. 50mg mon/tue/th/fri  62.5mg wed/sun) 30 Tablet 4    Drospirenone-Estetrol (NEXTSTELLIS) 3-14.2 MG Tab Take one pill daily. (Patient not taking: Reported on 11/7/2024) 28 Tablet 11    vitamin D3 (CHOLECALCIFEROL) 1000 Unit (25 mcg) Tab Take 1,000 Units by mouth every day.      ibuprofen (MOTRIN) 800 MG Tab Take 800 mg by mouth as needed. (Patient not taking: Reported on 11/7/2024)      cyclobenzaprine (FLEXERIL) 10 mg Tab Take 0.5-1 Tablets by mouth at bedtime as needed for Muscle Spasms. For muscle tightness / spasms 30 Tablet 1    montelukast (SINGULAIR) 10 MG Tab Take 1 Tablet by mouth every day. 90 Tablet 2    albuterol (PROVENTIL) 2.5mg/3ml Nebu Soln solution for nebulization Take 3 mL by nebulization every four hours as needed for Shortness of Breath. 300 mL 0       LABS:  Lab Results   Component Value Date/Time    HEMOGLOBIN 12.1 11/29/2024 0735    HEMATOCRIT 36.6 (L) 11/29/2024 0735    WBC 13.8 (H) 11/29/2024 0735     Lab Results   Component Value Date/Time    SODIUM 137 06/30/2022 1530    POTASSIUM 4.3 06/30/2022 1530    CHLORIDE 105 06/30/2022 1530     CO2 20 06/30/2022 1530    GLUCOSE 83 06/30/2022 1530    BUN 10 06/30/2022 1530    CALCIUM 8.2 (L) 06/30/2022 1530         PREVIOUS ANESTHETICS: See EMR  __________________________________________    Relevant Problems   Other   (positive) RAD (reactive airway disease)       Physical Exam    Airway   Mallampati: II  TM distance: >3 FB  Neck ROM: full       Cardiovascular - normal exam  Rhythm: regular  Rate: normal  (-) murmur     Dental - normal exam           Pulmonary - normal exam  Breath sounds clear to auscultation     Abdominal   (+) obese     Neurological - normal exam                   Anesthesia Plan    ASA 3   ASA physical status 3 criteria: morbid obesity - BMI greater than or equal to 40    Plan - spinal   Neuraxial block will be primary anesthetic                Postoperative Plan: Postoperative administration of opioids is intended.    Pertinent diagnostic labs and testing reviewed    Informed Consent:    Anesthetic plan and risks discussed with patient.

## 2024-11-29 NOTE — CARE PLAN
Problem: Potential for postpartum infection related to surgical incision, compromised uterine condition, urinary tract or respiratory compromise  Goal: Patient will be afebrile and free from signs and symptoms of infection  Outcome: Progressing     Problem: Potential anxiety related to difficulty adapting to parental role  Goal: Patient will verbalize and demonstrate effective bonding and parenting behavior  Outcome: Progressing   The patient is Stable - Low risk of patient condition declining or worsening    Shift Goals  Clinical Goals: VSS    Progress made toward(s) clinical / shift goals:  Pt caring for infant vital signs WDL

## 2024-11-29 NOTE — OP REPORT
DATE OF SERVICE:  2024     PREOPERATIVE DIAGNOSES:  1.  Intrauterine pregnancy at 37+1 week gestation.  2.  History of previous  x1, desires elective repeat.  3.  Advanced maternal age.  4.  History of COVID-19.  5.  Umbilical vein varices, followed by [facility] with the   recommendation of delivery at 37 weeks' gestation.     POSTOPERATIVE DIAGNOSES:  1.  Intrauterine pregnancy at 37+1 week gestation.  2.  History of previous  x1, desires elective repeat.  3.  Advanced maternal age.  4.  History of COVID-19.  5.  Umbilical vein varices, followed by [facility] with the   recommendation of delivery at 37 weeks' gestation.     PROCEDURE:  Repeat low transverse  section via Pfannenstiel skin   incision without tubal sterilization.     SURGEON:  Yanelis Mei MD     ASSISTANT:  Christie Nunez MD     ANESTHESIOLOGIST:  Vito Willams DO     ANESTHESIA:  Spinal.     SPECIMENS:  Cord gases.     ESTIMATED BLOOD LOSS:  700 mL.     FINDINGS:  Viable female infant in the BRENT position with Apgars of 9 at 1   minute, 9 at 5 minutes, weighing 6 pounds 14 ounces, 3120 grams.  Clear fluid   noted.  No nuchal cord.  30 seconds of delayed cord clamping performed.    Normal uterus.  Normal bilateral tubes and ovaries.  Cord gases arterial pH   7.28, base excess -3. Venous pH 7.34, base excess -3.     COMPLICATIONS:  None apparent.     INDICATIONS FOR THE PROCEDURE:  The patient is a 39-year-old  2, para   1-0-0-1 at 37+1 weeks' gestation based upon a 9+4 week ultrasound performed on   2024.  The patient has a history of  x1 and desires elective   repeat.  In addition, the patient has a known umbilical vein varices and the   recommendation was made by [facility] that she be delivered at   37 weeks' gestation.     DETAILS OF THE PROCEDURE:  The patient was taken to the operating room where   she underwent spinal  anesthesia.  She was then prepped and draped in the   dorsal supine position with leftward tilt.  Her spinal anesthesia was found to   be adequate.  A Pfannenstiel skin incision was made with a scalpel using her   previous incision.  The incision was carried down to the level of the fascia.    The fascia was incised in the midline.  The fascial incision was extended   laterally with the electrocautery and Dent scissors.  Areas of bleeding   cauterized with electrocautery.  The rectus muscles were dissected off the   fascia in the usual fashion.  The rectus muscles were divided in the midline.    The peritoneum was identified, grasped with hemostats and entered sharply   with Metzenbaum scissors.  The incision was extended superiorly and inferiorly   with good visualization of the bladder.  The peritoneum was stretched.  The   Robi O retractor was placed within the patient's pelvis and abdomen.  The   vesicouterine peritoneum was identified, grasped with pickups and entered   sharply with Metzenbaum scissors.  The incision was extended laterally with   Metzenbaum scissors and the bladder flap was created digitally.  A low segment   transverse incision was made with a scalpel.  The incision was extended   laterally with blunt dissection.  The amniotic sac was ruptured and copious   clear fluid was noted.  The vertex of the infant was in the BRENT position.  It   was rotated, flexed and delivered through all the incisions.  The infant's   mouth and nose were bulb suctioned.  No nuchal cord was noted.  The remainder   of the infant delivered without difficulty.  A 30 seconds of delayed cord   clamping was performed and then the cord was clamped and cut.  A segment of   cord was clamped and cut for cord gas and these results are noted above.  The   placenta delivered intact with 3-vessel cord.  The uterus was cleared of   amniotic fluid, blood clots and membranes with moist laparotomy sponges.  The   uterus was noted to  be quite atonic at this time.  The patient received IV   Pitocin and IV tranexamic acid.  The uterine incision was grasped with   Choudhary clamps and reapproximated with 0 Vicryl in a running locked   fashion.  Small areas of bleeding on the serosal surface of the uterus on the   incisions were made hemostatic with several figure-of-X of 0 Vicryl suture.   After vigorous uterine massage and the uterotonics, the tone improved.  The   bilateral tubes and ovaries were examined and found to be within normal   limits.  The uterus was irrigated copiously with sterile water and found to be   hemostatic.  The Robi O retractor was removed from the patient's pelvis and   abdomen.  The peritoneum was grasped with hemostats and reapproximated with   2-0 Vicryl.  The rectus muscles and fascia were irrigated with sterile water.    Areas of bleeding cauterized with electrocautery.  The rectus muscles were   reapproximated with 2-0 Vicryl in a vertical mattress suture fashion.  The   fascia was closed with 0 PDS.  The subcutaneous tissues were irrigated with   sterile water.  Areas of bleeding cauterized with electrocautery.  The   subcutaneous tissues were reapproximated in a 2-layer closure, the first layer   in an interrupted fashion to reapproximate Brian's fascia and the second   layer in a running fashion to bring the skin edges into closer proximity.  The   skin was closed with 4-0 Monocryl in a subcuticular fashion.  Bimanual   uterine massage and exploration was performed and a small amount of clots were   noted from the lower uterine segment.  The fundus was firm one below the   umbilicus.  The patient received 1000 mcg of Cytotec per rectum x1 after the   above examination.  The incision was dressed with a Mepilex dressing and a   pressure dressing.  The patient was transferred to the PACU in stable   condition.  Sponge, lap, needle and instrument counts were correct x2.        ______________________________  Yanelis  MD JAZMINE Matthews/AZM    DD:  11/29/2024 09:36  DT:  11/29/2024 09:53    Job#:  [id number]    CC:Christie Nunez MD

## 2024-11-29 NOTE — OR SURGEON
Immediate Post OP Note    PreOp Diagnosis:     1.  Intrauterine pregnancy at 37+1 weeks' gestation.  2.  History of previous , desires elective repeat.  3.  Advanced maternal age.  4.  Umbilical vein varix followed by [facility] with the   recommendation of delivery at 37 weeks' gestation.      PostOp Diagnosis: as above.      Procedure(s):  REPEAT  SECTION AND ANY OTHER MEDICALLY NECESSARY PROCEDURES    Surgeon(s):  Yanelis Mei M.D.    Assistant:  Christie Nunez MD    Anesthesiologist/anesthesia:  Dr. Willams/Spinal.    Surgical Staff:  * No surgical staff found *    Specimens removed if any:   Cord gases.      Estimated Blood Loss: 700 cc    Findings: viable female infant in the BRENT position with APGARS of 9 at one minute and 9 at five minutes.  Weight - 6 pounds 14 ounces (3120 grams).  Clear fluid.  No nuchal cord.  30 seconds of delayed cord clamping performed.  Normal uterus. Normal bilateral tubes and ovaries.      Cord gases:     Latest Reference Range & Units 24 09:09   Cord Bg Ph  7.28   Cord Bg Pco2 mmHg 53.3   Cord Bg Po2 mmHg 23.1   Cord Bg Hco3 mmol/L 25   Cord Bg Base Excess mmol/L -3   Cord Bg O2 Saturation % 51.6   CV Ph  7.34   CV Pco2 mmHg 42.4   CV Po2  30.6   CV Hco3 mmol/L 22   CV Base Excess mmol/L -3   CV O2 Saturation % 69.9           Complications: none apparent.        2024 8:03 AM Yanelis Mei M.D.

## 2024-11-29 NOTE — ANESTHESIA PROCEDURE NOTES
Spinal Block    Date/Time: 11/29/2024 8:37 AM    Performed by: Vito Willams D.O.  Authorized by: Vito Willams D.O.    Patient Location:  OR  Start Time:  11/29/2024 8:37 AM  Reason for Block: primary anesthetic    patient identified, IV checked, site marked, risks and benefits discussed, surgical consent, monitors and equipment checked, pre-op evaluation and timeout performed    Patient Position:  Sitting  Prep: ChloraPrep, patient draped and sterile technique    Monitoring:  Blood pressure, continuous pulse oximetry and heart rate  Approach:  Midline  Location:  L3-4  Injection Technique:  Single-shot  Skin infiltration:  Lidocaine  Strength:  1%  Dose:  3ml  Needle Type:  Toan  Needle Gauge:  25 G  CSF flowing pre/post injection:  Yes  Sensory Level:  T4   1 pass, no apparent complications

## 2024-11-29 NOTE — PROGRESS NOTES
Late entry due to patient care.     0700 RN at bedside for introduction to patient. Patient presents for repeat  section. EFM and TOCO applied. Patient afebrile with VSS. Patient has no complaints of VB, contractions, or LOF. Patient reports positive fetal movement. POC discussed, questions answered. IV started, labs drawn. Patient notified that surgery will be delayed due to emergent case on the floor. Patient expresses understanding.     0829 Patient arrival to OR 1   0857 Delivery of viable female 9 APGAR  0931 Patient arrival via gurney to PACU bed 1. Education about fundal rubs and lochia expectations provided. Pain education provided.     1045 Patient transported via gurney in apparent stable condition with infant in arms and in possession of all belonging to the postpartum department. Reports given to GEO Alvarado. Bands and cuddles verified, fundus and lochia assessed. Transfer of care at this time.

## 2024-11-30 VITALS
TEMPERATURE: 97.6 F | WEIGHT: 275 LBS | HEIGHT: 65 IN | BODY MASS INDEX: 45.82 KG/M2 | DIASTOLIC BLOOD PRESSURE: 83 MMHG | RESPIRATION RATE: 18 BRPM | HEART RATE: 72 BPM | SYSTOLIC BLOOD PRESSURE: 119 MMHG | OXYGEN SATURATION: 95 %

## 2024-11-30 LAB
ERYTHROCYTE [DISTWIDTH] IN BLOOD BY AUTOMATED COUNT: 43.5 FL (ref 35.9–50)
HCT VFR BLD AUTO: 29.9 % (ref 37–47)
HGB BLD-MCNC: 10 G/DL (ref 12–16)
MCH RBC QN AUTO: 27.8 PG (ref 27–33)
MCHC RBC AUTO-ENTMCNC: 33.4 G/DL (ref 32.2–35.5)
MCV RBC AUTO: 83.1 FL (ref 81.4–97.8)
PLATELET # BLD AUTO: 194 K/UL (ref 164–446)
PMV BLD AUTO: 9.5 FL (ref 9–12.9)
RBC # BLD AUTO: 3.6 M/UL (ref 4.2–5.4)
WBC # BLD AUTO: 18.5 K/UL (ref 4.8–10.8)

## 2024-11-30 PROCEDURE — 90471 IMMUNIZATION ADMIN: CPT

## 2024-11-30 PROCEDURE — 36415 COLL VENOUS BLD VENIPUNCTURE: CPT

## 2024-11-30 PROCEDURE — A9270 NON-COVERED ITEM OR SERVICE: HCPCS | Performed by: OBSTETRICS & GYNECOLOGY

## 2024-11-30 PROCEDURE — 770002 HCHG ROOM/CARE - OB PRIVATE (112)

## 2024-11-30 PROCEDURE — A9270 NON-COVERED ITEM OR SERVICE: HCPCS | Performed by: STUDENT IN AN ORGANIZED HEALTH CARE EDUCATION/TRAINING PROGRAM

## 2024-11-30 PROCEDURE — 90707 MMR VACCINE SC: CPT | Performed by: OBSTETRICS & GYNECOLOGY

## 2024-11-30 PROCEDURE — 3E0134Z INTRODUCTION OF SERUM, TOXOID AND VACCINE INTO SUBCUTANEOUS TISSUE, PERCUTANEOUS APPROACH: ICD-10-PCS | Performed by: OBSTETRICS & GYNECOLOGY

## 2024-11-30 PROCEDURE — 85027 COMPLETE CBC AUTOMATED: CPT

## 2024-11-30 PROCEDURE — 302151 K-PAD 14X20: Performed by: OBSTETRICS & GYNECOLOGY

## 2024-11-30 PROCEDURE — 700102 HCHG RX REV CODE 250 W/ 637 OVERRIDE(OP): Performed by: OBSTETRICS & GYNECOLOGY

## 2024-11-30 PROCEDURE — 700111 HCHG RX REV CODE 636 W/ 250 OVERRIDE (IP): Performed by: OBSTETRICS & GYNECOLOGY

## 2024-11-30 PROCEDURE — 700102 HCHG RX REV CODE 250 W/ 637 OVERRIDE(OP): Performed by: STUDENT IN AN ORGANIZED HEALTH CARE EDUCATION/TRAINING PROGRAM

## 2024-11-30 PROCEDURE — 700111 HCHG RX REV CODE 636 W/ 250 OVERRIDE (IP): Mod: JZ | Performed by: STUDENT IN AN ORGANIZED HEALTH CARE EDUCATION/TRAINING PROGRAM

## 2024-11-30 RX ADMIN — MEASLES, MUMPS, AND RUBELLA VIRUS VACCINE LIVE 0.5 ML: 1000; 12500; 1000 INJECTION, POWDER, LYOPHILIZED, FOR SUSPENSION SUBCUTANEOUS at 08:36

## 2024-11-30 RX ADMIN — ACETAMINOPHEN 1000 MG: 500 TABLET ORAL at 17:23

## 2024-11-30 RX ADMIN — PRENATAL WITH FERROUS FUM AND FOLIC ACID 1 TABLET: 3080; 920; 120; 400; 22; 1.84; 3; 20; 10; 1; 12; 200; 27; 25; 2 TABLET ORAL at 08:36

## 2024-11-30 RX ADMIN — IBUPROFEN 800 MG: 800 TABLET, FILM COATED ORAL at 17:23

## 2024-11-30 RX ADMIN — Medication 1 TABLET: at 08:36

## 2024-11-30 RX ADMIN — KETOROLAC TROMETHAMINE 15 MG: 15 INJECTION, SOLUTION INTRAMUSCULAR; INTRAVENOUS at 02:56

## 2024-11-30 RX ADMIN — ACETAMINOPHEN 1000 MG: 500 TABLET ORAL at 12:39

## 2024-11-30 RX ADMIN — KETOROLAC TROMETHAMINE 15 MG: 15 INJECTION, SOLUTION INTRAMUSCULAR; INTRAVENOUS at 08:35

## 2024-11-30 RX ADMIN — ACETAMINOPHEN 1000 MG: 500 TABLET ORAL at 05:30

## 2024-11-30 ASSESSMENT — EDINBURGH POSTNATAL DEPRESSION SCALE (EPDS)
I HAVE FELT SAD OR MISERABLE: NO, NOT AT ALL
I HAVE LOOKED FORWARD WITH ENJOYMENT TO THINGS: AS MUCH AS I EVER DID
I HAVE FELT SCARED OR PANICKY FOR NO GOOD REASON: YES, SOMETIMES
I HAVE BEEN ANXIOUS OR WORRIED FOR NO GOOD REASON: YES, SOMETIMES
I HAVE BEEN SO UNHAPPY THAT I HAVE BEEN CRYING: ONLY OCCASIONALLY
I HAVE BLAMED MYSELF UNNECESSARILY WHEN THINGS WENT WRONG: NOT VERY OFTEN
I HAVE BEEN SO UNHAPPY THAT I HAVE HAD DIFFICULTY SLEEPING: NOT AT ALL
THE THOUGHT OF HARMING MYSELF HAS OCCURRED TO ME: NEVER
THINGS HAVE BEEN GETTING ON TOP OF ME: NO, MOST OF THE TIME I HAVE COPED QUITE WELL
I HAVE BEEN ABLE TO LAUGH AND SEE THE FUNNY SIDE OF THINGS: AS MUCH AS I ALWAYS COULD

## 2024-11-30 ASSESSMENT — PAIN DESCRIPTION - PAIN TYPE
TYPE: ACUTE PAIN
TYPE: SURGICAL PAIN
TYPE: ACUTE PAIN

## 2024-11-30 NOTE — DISCHARGE PLANNING
Discharge Planning Assessment Post Partum    Reason for Referral: History of PPD  Address: [address] [place] [id number]   Type of Living Situation:Stable  Mom Diagnosis:  Delivery  Baby Diagnosis:   Primary Language: English    Name of Baby: Rosalind  Father of the Baby: Yusuf Rivera  Involved in baby’s care? yes  Contact Information: [phone]    Prenatal Care: yes  Mom's PCP:   REJI Thurston  PCP for new baby:Dr. Torres    Support System: yes, family  Coping/Bonding between mother & baby: yes  Source of Feeding: breast feeding  Supplies for Infant: yes    Mom's Insurance: [employer]  Baby Covered on Insurance:yes  Mother Employed/School: mother is employed at [employer] and father is employed at [employer]  Other children in the home/names & ages: yes, Yusuf (2.5)     Financial Hardship/Income: none mother and father are both employed.    Mom's Mental status: appropriate.   Services used prior to admit: [facility], Therapy through [facility].     CPS History: none  Psychiatric History: mother is working with a therapist through [facility] to manage any depression/PPD. Mother feels comfortable reaching out to her doctor or therapist for help and feels both her and father are now aware of the warning signs of PPD  Domestic Violence History: none  Drug/ETOH History: none    Resources Provided: mother feels well connected to resources and denied the need for additional resources.   Referrals Made: none     Clearance for Discharge: baby to discharge to parents once medically appropriate.

## 2024-11-30 NOTE — PROGRESS NOTES
1930: Received report from GEO Alvarado. Performed fundal massage and checked for bleeding with GEO Alvarado prior to assuming care. Fundus firm, Lochia Scant, incision covered with pressure dressing. Encouraged patient to increase PO water intake, Plan of care reviewed with patient and support person, both verbalized understanding.     2135: Call placed to Dr. Buck to inform of inadequate urine output, see I/O flowsheet. Telephone orders received for indwelling walter catheter to remain in place overnight, bolus patient with 1 liter LR, See MAR, and administer ketorolac when urine output is at least 30mL/hr per medication order. Updated patient with POC, verbalized understanding.

## 2024-11-30 NOTE — CARE PLAN
The patient is Stable - Low risk of patient condition declining or worsening    Shift Goals  Clinical Goals: Maintain stable vitals, increase urine output     Progress made toward(s) clinical / shift goals:    Problem: Altered physiologic condition related to postoperative  delivery  Goal: Patient physiologically stable as evidenced by normal lochia, palpable uterine involution and vital signs within normal limits  Outcome: Progressing     Problem: Alteration in comfort related to surgical incision and/or after birth pains  Goal: Patient verbalizes acceptable pain level  Outcome: Progressing     Problem: Potential knowledge deficit related to lack of understanding of self and  care  Goal: Patient will demonstrate ability to care for self and infant  Outcome: Progressing

## 2024-11-30 NOTE — LACTATION NOTE
Naomi is a 39 year old  who delivered via repeat C/S on  at 0857. Her daughter was 37+1 weeks and weighed 3120 grams/6 lbs. 14.1 ounces.    Naomi has a history of delayed milk onset, PCOS, and Hashimoto disease. She reports minimal breast changes this pregnancy but positive changes with her first baby. She was able to produce milk via pumping and nursing for her first until he developed RSV at 5 months.     Breasts appear slightly tubular with everted nipples.Baby latched effectively with intermittent visible swallows. She has passed two urine and one meconium.     Discussed adding pumping for increased breast stimulation based on her previous history. Naomi is in agreement.     Reviewed hand expression and handout given. HE encouraged to aid in milk production.  Skin to skin encouraged.     Mom does have HHP plan so rental prior to discharge encouraged. Outpatient follow-up encouraged. [facility] Resource list given and reviewed.    Per mom's request [facility].    Baby's 24 hour weight is 2915 grams/ 6 lbs. 6.8 ounces   3 step plan to be initiated Agueda GUARDADO to set up breastpump.  
0

## 2024-11-30 NOTE — CARE PLAN
The patient is Stable - Low risk of patient condition declining or worsening    Shift Goals  Clinical Goals: Patient will maintain hemodynamic stability and report good pain conrol through shift  Patient Goals: bonding with infant  Family Goals: updated and involved in poc    Progress made toward(s) clinical / shift goals:      Problem: Pain - Standard  Goal: Alleviation of pain or a reduction in pain to the patient’s comfort goal  Outcome: Progressing  Note: Pain well controlled with scheduled and prn pain meds.      Problem: Altered physiologic condition related to postoperative  delivery  Goal: Patient physiologically stable as evidenced by normal lochia, palpable uterine involution and vital signs within normal limits  Outcome: Progressing  Note: VSS on room air. Labs wdl. Bleeding wdl. Good I&Os.        Patient is not progressing towards the following goals:

## 2024-11-30 NOTE — PROGRESS NOTES
"POD #1 s/p RLTCS via Pfannenstiel skin incision without bilateral salpingectomy.    S:  Doing well but concerned that she may have an umbilical hernia as she believes she had this during her pregnancy and also had extreme pain during the c section at that site and after delivery with fundal massage.  She is working on breast feeding.  She has not yet voided as the catheter was just removed.  She has not yet passed gas. She denies nausea/vomiting/fevers/chills/night sweats.    O:  /75   Pulse 67   Temp 36.2 °C (97.1 °F) (Temporal)   Resp 17   Ht 1.651 m (5' 5\")   Wt 125 kg (275 lb)   SpO2 96%       Intake/Output Summary (Last 24 hours) at 11/30/2024 0734  Last data filed at 11/30/2024 0635  Gross per 24 hour   Intake 850 ml   Output 1950 ml   Net -1100 ml       A, A, and O x 3 NAD    FF u/1    No c/c/e    Incision: clean/dry/intact.  Mepilex dressing in place.    Recent Labs     11/29/24  0735 11/29/24  0949 11/30/24  0016   WBC 13.8* 14.5* 18.5*   RBC 4.42 4.64 3.60*   HEMOGLOBIN 12.1 12.6 10.0*   HEMATOCRIT 36.6* 39.4 29.9*   MCV 82.8 84.9 83.1   MCH 27.4 27.2 27.8   RDW 42.8 43.8 43.5   PLATELETCT 231 233 194   MPV 9.2 9.1 9.5   NEUTSPOLYS 76.60* 80.60*  --    LYMPHOCYTES 13.20* 11.70*  --    MONOCYTES 6.00 3.90  --    EOSINOPHILS 2.90 2.40  --    BASOPHILS 0.40 0.20  --      A/P: POD #1 s/p RLTCS via Pfannenstiel skin incision.     Mild acute blood loss anemia.  Begin Folitab.  Abdominal pain and possible hernia (umbilical) - plan referral to general surgery postpartum.  Abdominal binder and heating pad.  Interdry for patient for use after dressing is removed.  Ambulate TID.  ADAT.  Continue cares.  "

## 2024-12-01 ENCOUNTER — PHARMACY VISIT (OUTPATIENT)
Dept: PHARMACY | Facility: MEDICAL CENTER | Age: 39
End: 2024-12-01
Payer: COMMERCIAL

## 2024-12-01 VITALS
HEART RATE: 75 BPM | TEMPERATURE: 98.2 F | OXYGEN SATURATION: 97 % | DIASTOLIC BLOOD PRESSURE: 80 MMHG | RESPIRATION RATE: 20 BRPM | WEIGHT: 275 LBS | BODY MASS INDEX: 45.82 KG/M2 | SYSTOLIC BLOOD PRESSURE: 133 MMHG | HEIGHT: 65 IN

## 2024-12-01 PROCEDURE — 700102 HCHG RX REV CODE 250 W/ 637 OVERRIDE(OP): Performed by: OBSTETRICS & GYNECOLOGY

## 2024-12-01 PROCEDURE — RXMED WILLOW AMBULATORY MEDICATION CHARGE: Performed by: OBSTETRICS & GYNECOLOGY

## 2024-12-01 PROCEDURE — A9270 NON-COVERED ITEM OR SERVICE: HCPCS | Performed by: OBSTETRICS & GYNECOLOGY

## 2024-12-01 RX ORDER — IBUPROFEN 800 MG/1
800 TABLET, FILM COATED ORAL EVERY 8 HOURS PRN
Qty: 30 TABLET | Refills: 1 | Status: SHIPPED | OUTPATIENT
Start: 2024-12-01

## 2024-12-01 RX ORDER — OXYCODONE HYDROCHLORIDE 10 MG/1
10 TABLET ORAL EVERY 4 HOURS PRN
Qty: 20 TABLET | Refills: 0 | Status: SHIPPED | OUTPATIENT
Start: 2024-12-01 | End: 2024-12-01

## 2024-12-01 RX ORDER — PSEUDOEPHEDRINE HCL 30 MG
100 TABLET ORAL 2 TIMES DAILY PRN
Qty: 60 CAPSULE | Refills: 1 | Status: SHIPPED | OUTPATIENT
Start: 2024-12-01

## 2024-12-01 RX ORDER — ACETAMINOPHEN 500 MG
500 TABLET ORAL ONCE
Status: SHIPPED | OUTPATIENT
Start: 2024-12-01 | End: 2024-12-04

## 2024-12-01 RX ORDER — SIMETHICONE 125 MG
125 TABLET,CHEWABLE ORAL 4 TIMES DAILY PRN
Qty: 120 TABLET | Refills: 1 | Status: SHIPPED | OUTPATIENT
Start: 2024-12-01

## 2024-12-01 RX ORDER — ACETAMINOPHEN 500 MG
500-1000 TABLET ORAL EVERY 6 HOURS PRN
Qty: 30 TABLET | Refills: 1 | Status: SHIPPED | OUTPATIENT
Start: 2024-12-01

## 2024-12-01 RX ORDER — OXYCODONE HYDROCHLORIDE 5 MG/1
5 TABLET ORAL EVERY 4 HOURS PRN
Qty: 28 TABLET | Refills: 0 | Status: SHIPPED | OUTPATIENT
Start: 2024-12-01 | End: 2024-12-06

## 2024-12-01 RX ADMIN — PRENATAL WITH FERROUS FUM AND FOLIC ACID 1 TABLET: 3080; 920; 120; 400; 22; 1.84; 3; 20; 10; 1; 12; 200; 27; 25; 2 TABLET ORAL at 08:56

## 2024-12-01 RX ADMIN — IBUPROFEN 800 MG: 800 TABLET, FILM COATED ORAL at 10:39

## 2024-12-01 RX ADMIN — ACETAMINOPHEN 1000 MG: 500 TABLET ORAL at 00:11

## 2024-12-01 RX ADMIN — ACETAMINOPHEN 1000 MG: 500 TABLET ORAL at 06:38

## 2024-12-01 RX ADMIN — IBUPROFEN 800 MG: 800 TABLET, FILM COATED ORAL at 02:37

## 2024-12-01 RX ADMIN — Medication 1 TABLET: at 08:56

## 2024-12-01 ASSESSMENT — PAIN DESCRIPTION - PAIN TYPE
TYPE: ACUTE PAIN
TYPE: SURGICAL PAIN
TYPE: SURGICAL PAIN

## 2024-12-01 NOTE — PROGRESS NOTES
0900 - Received report from Yael GUARDADO at change of shift. Assumed care. Assessment completed. Fundus firm, lochia scant. Abdominal incision with dressing intact. Plan of care reviewed. Pt will call if med intervention needed. Educated parents to offer feedings on cue, at minimum of Q3 hours and to update I&O chart. Parents verbalized understanding. Encouraged parents to call for assistance when needed. Call light within reach. All questions and concerns answered at this time.     1210 - Discharge paperwork for pt and infant discussed with parents at bedside. All questions answered. Follow up appointment to be made by patient. NBS #2 dates given to parents. Parents verbalize understanding. Paperwork signed and dated at this time.     1220 - Infant discharged in car seat with parents. Infant placed in car seat by parents and checked by RN. Bands verified. Cuddles removed. Patient escorted off unit in wheelchair with Kimberly BOYKIN.

## 2024-12-01 NOTE — PROGRESS NOTES
Shift Goals  Clinical Goals: vitals stable, fundus firm, ambulate, voiding without difficulty  Patient Goals: pain control, rest  Family Goals: support    Progress made toward(s) clinical / shift goals:  Naomi is post  with her second baby, her vitals are stable, her fundus is firm, she is ambulating and voiding without difficulty, her pain is managed with ordered pain medications, her SO is at bedside for support, both appear to be bonding well with baby

## 2024-12-01 NOTE — CARE PLAN
Problem: Altered physiologic condition related to postoperative  delivery  Goal: Patient physiologically stable as evidenced by normal lochia, palpable uterine involution and vital signs within normal limits  Outcome: Progressing     Problem: Alteration in comfort related to surgical incision and/or after birth pains  Goal: Patient is able to ambulate, care for self and infant with acceptable pain level  Outcome: Progressing     Problem: Alteration in comfort related to surgical incision and/or after birth pains  Goal: Patient verbalizes acceptable pain level  Outcome: Progressing     Problem: Potential knowledge deficit related to lack of understanding of self and  care  Goal: Patient will verbalize understanding of self and infant care  Outcome: Progressing   The patient is Stable - Low risk of patient condition declining or worsening    Shift Goals  Clinical Goals: vitals stable, fundus firm, ambulate, voiding without difficulty  Patient Goals: pain control, rest  Family Goals: support    Progress made toward(s) clinical / shift goals:  Naomi is post  with her second baby, her vitals are stable, her fundus is firm, she is ambulating and voiding without difficulty, her pain is managed with ordered pain medications, her SO is at bedside for support, both appear to be bonding well with baby    Patient is not progressing towards the following goals:

## 2024-12-01 NOTE — DISCHARGE INSTRUCTIONS

## 2024-12-01 NOTE — DISCHARGE SUMMARY
Discharge Summary:      Naomi Rivera    Admit Date:   2024  Discharge Date:  2024     Admitting diagnosis:  Indication for care in labor or delivery [O75.9]  Discharge Diagnosis: Status post RLTCS    PREOPERATIVE DIAGNOSES:  1.  Intrauterine pregnancy at 37+1 week gestation.  2.  History of previous  x1, desires elective repeat.  3.  Advanced maternal age.  4.  History of COVID-19.  5.  Umbilical vein varices, [facility] with the   recommendation of delivery at 37 weeks' gestation.     POSTOPERATIVE DIAGNOSES:  1.  Intrauterine pregnancy at 37+1 week gestation.  2.  History of previous  x1, desires elective repeat.  3.  Advanced maternal age.  4.  History of COVID-19.  5.  Umbilical vein varices, followed by [facility] with the   recommendation of delivery at 37 weeks' gestation.     PROCEDURE:  Repeat low transverse  section via Pfannenstiel skin   incision without tubal sterilization.     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    Bronchitis     H/O    Cellulitis and abscess left foot    Disorder of thyroid     hashimotos    Eczema, dyshidrotic     Heart burn     just with pregnancy    Migraines     Preeclampsia     H/O    RAD (reactive airway disease)      OB History    Para Term  AB Living   2 2 2     2   SAB IAB Ectopic Molar Multiple Live Births           0 2      # Outcome Date GA Lbr Ricardo/2nd Weight Sex Type Anes PTL Lv   2 Term 24 37w1d  3.12 kg (6 lb 14.1 oz) F CS-LTranv Spinal N GEETA   1 Term 22 40w4d  3.7 kg (8 lb 2.5 oz) M CS-LTranv EPI N GEETA        Peanut-derived, Acyclovir, Iodine, Metformin hcl, and Shellfish allergy  Patient Active Problem List    Diagnosis Date Noted    Indication for care in labor or delivery 2024    Hashimoto's disease - [facility] endo 2024    Gestational  hypertension 2022    BMI 38.0-38.9,adult 2015    AR (allergic rhinitis)     RAD (reactive airway disease)     Eczema, dyshidrotic         Hospital Course:   39 y.o. , now para 2, was admitted with the above mentioned diagnosis, underwent RLTCS without bilateral salpingectomy.  Patient postpartum course was unremarkable, with progressive advancement in diet , ambulation and toleration of oral analgesia. Patient without complaints today and desires discharge.      Vitals:    24 0600 24 1800 24 0531 24 0758   BP: 125/75 119/83 (!) 132/93 (!) 141/87   Pulse: 67 72 76    Resp: 17 18 18    Temp: 36.2 °C (97.1 °F) 36.4 °C (97.6 °F) 36.3 °C (97.4 °F)    TempSrc: Temporal Temporal Temporal    SpO2: 96% 95% 97%    Weight:       Height:           Current Facility-Administered Medications   Medication Dose    ferrous sulfate-c-folic acid (Folitab 500) tablet 1 Tablet  1 Tablet    NS infusion      NS infusion      oxytocin (Pitocin) infusion (for post delivery)  125 mL/hr    oxytocin (Pitocin) injection 10 Units  10 Units    ibuprofen (Motrin) tablet 800 mg  800 mg    Followed by    [START ON 12/3/2024] ibuprofen (Motrin) tablet 800 mg  800 mg    acetaminophen (Tylenol) tablet 1,000 mg  1,000 mg    Followed by    [START ON 12/3/2024] acetaminophen (Tylenol) tablet 1,000 mg  1,000 mg    oxyCODONE immediate-release (Roxicodone) tablet 5 mg  5 mg    oxyCODONE immediate release (Roxicodone) tablet 10 mg  10 mg    ondansetron (Zofran) syringe/vial injection 4 mg  4 mg    Or    ondansetron (Zofran ODT) dispertab 4 mg  4 mg    diphenhydrAMINE (Benadryl) tablet/capsule 25 mg  25 mg    Or    diphenhydrAMINE (Benadryl) injection 25 mg  25 mg    docusate sodium (Colace) capsule 100 mg  100 mg    prenatal plus vitamin (Stuartnatal 1+1) 27-1 MG tablet 1 Tablet  1 Tablet    simethicone (Mylicon) chewable tablet 125 mg  125 mg    calcium carbonate (Tums) chewable tab 1,000 mg  1,000 mg    lactated  "ringers infusion         Exam:    BP (!) 141/87   Pulse 76   Temp 36.3 °C (97.4 °F) (Temporal)   Resp 18   Ht 1.651 m (5' 5\")   Wt 125 kg (275 lb)   SpO2 97%     A, A, and O x 3 NAD    FF u/1    Incision: clean/dry/intact.  Pressure dressing in place.  Mepilex dressing below this dressing.    No c/c/e    Labs:  Recent Labs     11/29/24  0735 11/29/24  0949 11/30/24  0016   WBC 13.8* 14.5* 18.5*   RBC 4.42 4.64 3.60*   HEMOGLOBIN 12.1 12.6 10.0*   HEMATOCRIT 36.6* 39.4 29.9*   MCV 82.8 84.9 83.1   MCH 27.4 27.2 27.8   MCHC 33.1 32.0* 33.4   RDW 42.8 43.8 43.5   PLATELETCT 231 233 194   MPV 9.2 9.1 9.5        Activity:   Discharge to home  Pelvic Rest x 6 weeks  Ambulate TID.  ADAT.  Continue prenatal vitamins while breast feeding.        Assessment:  Stable and ready for discharge.  Monitor blood pressure and review postpartum preeclampsia precautions.  Remove pressure dressing.  Mild acute blood loss anemia - continue iron supplementation postpartum.       Follow up: 2 weeks for incision check and again in 6 weeks for postpartum appointment.     Discharge Meds:   Current Outpatient Medications   Medication Sig Dispense Refill    docusate sodium 100 MG Cap Take 100 mg by mouth 2 times a day as needed for Constipation. 60 Capsule 1    [START ON 12/2/2024] ferrous sulfate-c-folic acid (FOLITAB 500) 105-500-0.8 MG Tab CR Take 1 Tablet by mouth every day. 30 Tablet 1    oxyCODONE immediate-release (ROXICODONE) 5 MG Tab Take 1 Tablet by mouth every four hours as needed for Severe Pain for up to 2 days. 8 Tablet 0    simethicone (MYLICON) 125 MG chewable tablet Chew 1 Tablet 4 times a day as needed for Flatulence. 120 Tablet 1    ibuprofen (MOTRIN) 800 MG Tab Take 1 Tablet by mouth every 8 hours as needed for Moderate Pain. 30 Tablet 1    acetaminophen (TYLENOL) 500 MG Tab Take 1-2 Tablets by mouth every 6 hours as needed for Moderate Pain. 30 Tablet 1       Yanelis Mei M.D.          "

## 2025-01-28 ENCOUNTER — OFFICE VISIT (OUTPATIENT)
Dept: MEDICAL GROUP | Facility: LAB | Age: 40
End: 2025-01-28
Payer: COMMERCIAL

## 2025-01-28 VITALS
WEIGHT: 228 LBS | RESPIRATION RATE: 12 BRPM | BODY MASS INDEX: 37.99 KG/M2 | TEMPERATURE: 97 F | OXYGEN SATURATION: 95 % | SYSTOLIC BLOOD PRESSURE: 110 MMHG | HEIGHT: 65 IN | HEART RATE: 72 BPM | DIASTOLIC BLOOD PRESSURE: 78 MMHG

## 2025-01-28 DIAGNOSIS — E06.3 HASHIMOTO'S DISEASE: ICD-10-CM

## 2025-01-28 DIAGNOSIS — J45.40 MODERATE PERSISTENT REACTIVE AIRWAY DISEASE WITHOUT COMPLICATION: ICD-10-CM

## 2025-01-28 DIAGNOSIS — M77.8 RIGHT SHOULDER TENDONITIS: ICD-10-CM

## 2025-01-28 PROCEDURE — 99214 OFFICE O/P EST MOD 30 MIN: CPT | Performed by: NURSE PRACTITIONER

## 2025-01-28 PROCEDURE — 3078F DIAST BP <80 MM HG: CPT | Performed by: NURSE PRACTITIONER

## 2025-01-28 PROCEDURE — 3074F SYST BP LT 130 MM HG: CPT | Performed by: NURSE PRACTITIONER

## 2025-01-28 RX ORDER — MONTELUKAST SODIUM 10 MG/1
10 TABLET ORAL DAILY
COMMUNITY
End: 2025-01-28 | Stop reason: SDUPTHER

## 2025-01-28 RX ORDER — MONTELUKAST SODIUM 10 MG/1
10 TABLET ORAL DAILY
Qty: 90 TABLET | Refills: 3 | Status: SHIPPED | OUTPATIENT
Start: 2025-01-28

## 2025-01-28 ASSESSMENT — PATIENT HEALTH QUESTIONNAIRE - PHQ9: CLINICAL INTERPRETATION OF PHQ2 SCORE: 0

## 2025-01-28 NOTE — PROGRESS NOTES
"Verbal consent was acquired by the patient to use i[employer] ambient listening note generation during this visit Yes      Subjective   Naomi Rivera is a 39 y.o. female who presents for right shoulder pain  History of Present Illness  The patient is a 39-year-old established female who presents for evaluation of right shoulder pain.    She has been experiencing persistent right shoulder pain for approximately one month, which she reports as being severe enough to interfere with daily activities such as raising her arm, washing her hair, and applying deodorant. The onset of the pain was sudden and occurred about a month after the birth of her child -she is 2 months postpartum. She is right-handed and alternates between holding her baby with her right and left arms while breastfeeding. She reports no specific injury or trauma to the shoulder. Additionally, she experiences neck pain, which she believes is stress-related. She is scheduled to return to work on Monday. She has been managing the pain with Tylenol and ibuprofen 800 mg once or twice daily, depending on the severity of the pain, which she finds beneficial.    She has been taking Singulair, prescribed by her OB-GYN during her pregnancy, and reports that it has been effective in preventing asthma exacerbations despite exposure to illness. She does not report any feelings of depression.    She continues to take levothyroxine 25 mcg as prescribed by her endocrinologist, Dr. Caryl Randolph, whom she saw 2 weeks ago. She was advised to increase the dose to 50 mcg for 2 days if she experiences fatigue. Her recent lab results were within normal limits.      Review of Systems  Negative except for HPI  Objective   /78 (BP Location: Left arm, Patient Position: Sitting, BP Cuff Size: Large adult)   Pulse 72   Temp 36.1 °C (97 °F)   Resp 12   Ht 1.651 m (5' 5\")   Wt 103 kg (228 lb)   SpO2 95%   Physical Exam  Right shoulder exam: No bony tenderness, " deformity, erythema or bruising.  She has limited range of motion due to pain.  She is unable to adduct her right shoulder beyond 90 degrees due to discomfort, pointing to her medial deltoid.  She is able to extend her arm behind her back without pain but has pain with extension in the frontal plane beyond 90 degrees.  No clicking or popping.   are 2 out of 2 bilaterally.  General: No apparent distress, well-developed, well-nourished     Assessment & Plan  1 right shoulder pain:  The symptoms are consistent with rotator cuff tendinitis, likely due to inflammation rather than a tear, given the absence of trauma.  Potentially due to overuse as she is 8 weeks postpartum and newly breast-feeding, likely utilizing her right shoulder more than she is aware.  Getting into physical therapy is difficult for her as she is returning to work in a few days and has an 8-week-old baby.  She prefers to try online/home PT for a few weeks and then will move forward with PT if not improving.  We discussed looking up shoulder tendinitis/frozen shoulder physical therapy and performing these every other day along with stretches and range of motion daily.  Discussed utilizing ice after she does shoulder exercises and heat prior to.  She is also advised to take ibuprofen 800 mg twice daily with food for a duration of 7 days. If she experiences severe heartburn, she should discontinue ibuprofen for a few days and consider adding Prilosec or Prevacid to her regimen. She is encouraged to maintain adequate hydration. A formal referral to physical therapy will be initiated. If there is no improvement, an MRI may be considered, followed by potential steroid injections.    2. Asthma.  A prescription for Singulair 90 tablets will be provided, to be filled at [facility]. She is informed that Singulair can cause unusual dreams and mild depression, although she has not experienced these side effects.    3. Hypothyroidism.  She will  continue her current levothyroxine dosage as managed by Dr. Caryl Randolph. Labs will be rechecked to ensure the correct dosage.               Please note that this dictation was created using voice recognition software. I have made every reasonable attempt to correct obvious errors, but I expect that there are errors of grammar and possibly content that I did not discover before finalizing the note.

## 2025-02-21 ENCOUNTER — PATIENT MESSAGE (OUTPATIENT)
Dept: MEDICAL GROUP | Facility: LAB | Age: 40
End: 2025-02-21
Payer: COMMERCIAL

## 2025-04-14 ENCOUNTER — PATIENT MESSAGE (OUTPATIENT)
Dept: MEDICAL GROUP | Facility: LAB | Age: 40
End: 2025-04-14
Payer: COMMERCIAL

## 2025-04-14 DIAGNOSIS — M25.511 ACUTE PAIN OF RIGHT SHOULDER: ICD-10-CM

## 2025-07-02 ENCOUNTER — OFFICE VISIT (OUTPATIENT)
Dept: MEDICAL GROUP | Facility: LAB | Age: 40
End: 2025-07-02
Payer: COMMERCIAL

## 2025-07-02 VITALS
HEART RATE: 82 BPM | RESPIRATION RATE: 12 BRPM | DIASTOLIC BLOOD PRESSURE: 76 MMHG | OXYGEN SATURATION: 97 % | BODY MASS INDEX: 39.15 KG/M2 | WEIGHT: 235 LBS | SYSTOLIC BLOOD PRESSURE: 118 MMHG | HEIGHT: 65 IN | TEMPERATURE: 96.4 F

## 2025-07-02 DIAGNOSIS — G43.809 OTHER MIGRAINE WITHOUT STATUS MIGRAINOSUS, NOT INTRACTABLE: ICD-10-CM

## 2025-07-02 DIAGNOSIS — E06.3 HASHIMOTO'S DISEASE: ICD-10-CM

## 2025-07-02 DIAGNOSIS — M25.50 MULTIPLE JOINT PAIN: ICD-10-CM

## 2025-07-02 DIAGNOSIS — Z23 NEED FOR PNEUMOCOCCAL VACCINATION: ICD-10-CM

## 2025-07-02 DIAGNOSIS — Z00.00 PREVENTATIVE HEALTH CARE: ICD-10-CM

## 2025-07-02 PROBLEM — G43.909 MIGRAINE: Status: ACTIVE | Noted: 2025-07-02

## 2025-07-02 PROCEDURE — 3078F DIAST BP <80 MM HG: CPT | Performed by: NURSE PRACTITIONER

## 2025-07-02 PROCEDURE — 3074F SYST BP LT 130 MM HG: CPT | Performed by: NURSE PRACTITIONER

## 2025-07-02 PROCEDURE — 99214 OFFICE O/P EST MOD 30 MIN: CPT | Performed by: NURSE PRACTITIONER

## 2025-07-02 RX ORDER — PHENTERMINE HYDROCHLORIDE 15 MG/1
15 CAPSULE ORAL EVERY MORNING
Qty: 30 CAPSULE | Refills: 1 | Status: SHIPPED | OUTPATIENT
Start: 2025-07-02 | End: 2025-08-01

## 2025-07-02 RX ORDER — LEVOTHYROXINE SODIUM 25 UG/1
25 TABLET ORAL
Qty: 90 TABLET | Refills: 3 | Status: SHIPPED | OUTPATIENT
Start: 2025-07-02

## 2025-07-02 NOTE — PATIENT INSTRUCTIONS
To loose weight:   30 grams of fiber per day.  Less than 80 grams of carbs per day.    100 gm protein per day.  No white carbs.    Avoid sugar.    15-40 min exercise everyday, at least 30 minutes 5 days per week.

## 2025-07-02 NOTE — PROGRESS NOTES
Verbal consent was acquired by the patient to use CR2 ambient listening note generation during this visit Yes      Peter Rivera is a 39 y.o. female who presents for a few issues.    History of Present Illness  The patient is a 39-year-old established female who presents with complaints of weight and thyroid issues. She was previously followed by endocrinology, Dr. Randolph regarding Hashimoto's disease. She was last seen about 6 months ago.    She has been off levothyroxine for approximately 1 to 2 months and is experiencing symptoms similar to those prior to her diagnosis. She reports constipation, weight gain, fatigue, and intolerance to extreme temperatures. Her last dose of levothyroxine was 25 mcg during her pregnancy, which was increased to 50 mcg at 7 months gestation due to lab results. However, the dose was later reduced back to 25 mcg. She has noticed a significant difference in her energy levels since discontinuing the medication.    She experiences body aches upon waking, including neck and back pain, and tenderness in the soles of her feet. She also reports pain in her wrists, fingers, ankles, knees, and hips, but no redness or swelling in her joints. She has a history of arthritis in her mother, but no family history of autoimmune arthritis such as lupus or rheumatoid arthritis.    Her ideal weight is around 200 pounds. She lost a significant amount of weight while breastfeeding but regained it quickly after stopping. She works three 12-hour shifts per week and finds it difficult to exercise due to her weight. She has purchased a [employer] but has not yet set it up. She has eliminated dairy from her diet and noticed an improvement in her symptoms. She has previously tried phentermine, which was effective but caused palpitations, leading to its discontinuation. She typically skips breakfast and has lunch as her first meal of the day.  She struggles to avoid Posta, rice and  "bread.    Her asthma is generally well-controlled, although she sometimes experiences shortness of breath when climbing stairs. She uses albuterol as needed for symptom relief. She takes montelukast daily although is currently out of it and refilling soon, and uses Claritin or Zyrtec as needed.    She has not had her right shoulder evaluated since her last visit. She experienced severe pain for two days, which has since improved but not completely resolved. She has been doing wall climbing exercises and physical therapy at home, but these have not significantly improved her symptoms. She uses a massager for relief and occasionally experiences a frozen shoulder in the morning.    She has been taking saffron for the past four days to help with memory and depression, as recommended by her therapist.    She also struggles with migraines.  A large monitor at work is helpful and she needs a doctor's note regarding the need for a large monitor.    FAMILY HISTORY  Her mother has arthritis, but not autoimmune arthritis.    Review of Systems  Negative except for HPI  Objective   /76 (BP Location: Right arm, Patient Position: Sitting, BP Cuff Size: Large adult)   Pulse 82   Temp (!) 35.8 °C (96.4 °F)   Resp 12   Ht 1.651 m (5' 5\")   Wt 107 kg (235 lb)   SpO2 97%   Physical Exam  Gen. appears healthy in no distress   Skin appropriate for sex and age   Neck trachea is midline  Lungs unlabored breathing  Heart regular rate  Neuro gait and station normal   Psych appropriate, calm, interactive, well-groomed         Assessment & Plan  1. Hashimoto's disease  Symptoms of fatigue, weight gain, and temperature intolerance suggest a need for reinitiation of levothyroxine at 25 mcg every morning on empty stomach  Diagnostic plan: Thyroid function tests (TSH and T4) will be conducted in 4-6 weeks to monitor response. If symptoms persist despite normal TSH and T4 levels, T3 levels may be checked.  Treatment plan: Restarting " levothyroxine 25 mcg is recommended. Cytomel may be added if necessary.    2.  Morbid obesity:  A comprehensive weight loss plan includes dietary modifications (30 g of fiber, <80 g of carbs, 100 g of protein daily, and avoidance of white carbs and sugar), regular exercise (15-20 minutes daily), and medication (phentermine).  Treatment plan: Phentermine will be started at a low dose (15 mg) to minimize side effects. If well-tolerated and weight loss plateaus, the dose may be increased. Encouraged to set up and use the [employer] for exercise.  She has had heart palpitations with phentermine in the past but at higher dosages and will discontinue it if she begins to have heart palpitations.  Follow-up 3 months, sooner with any problems or concerns.    3. Asthma  Asthma is generally well-controlled but exacerbated by physical activity and allergies.  Treatment plan: Continue using albuterol as needed for acute symptoms. Montelukast will be picked up and used daily.     4. Shoulder pain  We discussed orthopedic consult, formal physical therapy, continued stretching/strengthening, imaging, injection.  At this time she would like to continue with conservative home physical therapy.    5. Health maintenance  A yearly lab panel, including A1c and vitamin D levels, will be conducted.        6. Migraines  A note for work will be provided to request a large monitor to assist in reducing migraines.    Follow-up: Thyroid function tests in 4-6 weeks.               Please note that this dictation was created using voice recognition software. I have made every reasonable attempt to correct obvious errors, but I expect that there are errors of grammar and possibly content that I did not discover before finalizing the note.

## 2025-07-02 NOTE — LETTER
Naomi Rivera had an appointment with us today 7/2/2025.   Please allow her to have a large monitor to assist in reduction of migraines.          Thank you,         HELADIO Thurston.  Electronically Signed

## 2025-07-29 ENCOUNTER — APPOINTMENT (OUTPATIENT)
Dept: MEDICAL GROUP | Facility: LAB | Age: 40
End: 2025-07-29
Payer: COMMERCIAL

## (undated) DEVICE — BLANKET STERILE CHICKIE FOR L&D (100/CA)

## (undated) DEVICE — WATER IRRIGATION STERILE 1000ML (12EA/CA)

## (undated) DEVICE — SUTURE 3-0 VICRYL PLUS CT-1 - 36 INCH (36/BX)

## (undated) DEVICE — KIT SKIN NOSE AND MOUTH PRE-OP (20/CA)

## (undated) DEVICE — CLOTH PATIENT SKIN PREP 2% (192EA/CA)

## (undated) DEVICE — BAG SPONGE COUNT 10.25 X 32 - BLUE (250/CA)

## (undated) DEVICE — TRAY SPINAL ANESTHESIA NON-SAFETY (10/CA)

## (undated) DEVICE — BLANKET UNDERBODY FULL ACCES - (5/CA)

## (undated) DEVICE — PLUMEPEN ULTRA 3/8 IN X 10 FT HOSE (20EA/CA)

## (undated) DEVICE — CHLORAPREP 26 ML APPLICATOR - ORANGE TINT(25/CA)

## (undated) DEVICE — SUTURE 0 36IN PDS + VIO CT-1 (36PK/BX)

## (undated) DEVICE — ELECTRODE DUAL RETURN W/ CORD - (50/PK)

## (undated) DEVICE — DRESSING POST OP BORDER 4 X 10 (5EA/BX)

## (undated) DEVICE — PACK C-SECTION (2EA/CA)

## (undated) DEVICE — SUTURE 0 VICRYL PLUS CT-1 - 36 INCH (36/BX)

## (undated) DEVICE — KIT  I.V. START (100EA/CA)

## (undated) DEVICE — SUTURE3-0 36IN VCRLY PLS ANTI (36PK/BX)

## (undated) DEVICE — DRESSING ABDOMINAL PAD STERILE 8 X 10" (360EA/CA)"

## (undated) DEVICE — SOLUTION PLASMA-LYTE PH 7.4 INJ 1000ML (14EA/CA)

## (undated) DEVICE — SET EXTENSION WITH 2 PORTS (48EA/CA) ***PART #2C8610 IS A SUBSTITUTE*****

## (undated) DEVICE — STAPLER SKIN DISP - (6/BX 10BX/CA) VISISTAT

## (undated) DEVICE — CANNULA O2 COMFORT SOFT EAR ADULT 7 FT TUBING (50/CA)

## (undated) DEVICE — GLOVE BIOGEL SZ 7 SURGICAL PF LTX - (50PR/BX 4BX/CA)

## (undated) DEVICE — BLANKET UNDERBODY ADULT - (10/CA)

## (undated) DEVICE — SUTURE 2-0 CHROMIC GUT CT-1 27 (36PK/BX)"

## (undated) DEVICE — CANISTER SUCTION 3000ML MECHANICAL FILTER AUTO SHUTOFF MEDI-VAC NONSTERILE LF DISP (40EA/CA)

## (undated) DEVICE — CATHETER IV NON-SAFETY 18 GA X 1 1/4 (50/BX 4BX/CA)

## (undated) DEVICE — HEAD HOLDER JUNIOR/ADULT

## (undated) DEVICE — RETRACTOR O C SECTION LRY - (5/BX)

## (undated) DEVICE — TUBING CLEARLINK DUO-VENT - C-FLO (48EA/CA)

## (undated) DEVICE — TAPE CLOTH MEDIPORE 6 INCH - (12RL/CA)

## (undated) DEVICE — GLOVE BIOGEL SZ 6.5 SURGICAL PF LTX (50PR/BX 4BX/CA)